# Patient Record
Sex: FEMALE | Race: BLACK OR AFRICAN AMERICAN | NOT HISPANIC OR LATINO | Employment: FULL TIME | URBAN - METROPOLITAN AREA
[De-identification: names, ages, dates, MRNs, and addresses within clinical notes are randomized per-mention and may not be internally consistent; named-entity substitution may affect disease eponyms.]

---

## 2024-01-31 ENCOUNTER — PATIENT MESSAGE (OUTPATIENT)
Dept: PSYCHIATRY | Facility: CLINIC | Age: 44
End: 2024-01-31
Payer: COMMERCIAL

## 2024-01-31 DIAGNOSIS — F90.0 ADHD (ATTENTION DEFICIT HYPERACTIVITY DISORDER), INATTENTIVE TYPE: ICD-10-CM

## 2024-01-31 RX ORDER — DEXTROAMPHETAMINE SACCHARATE, AMPHETAMINE ASPARTATE MONOHYDRATE, DEXTROAMPHETAMINE SULFATE AND AMPHETAMINE SULFATE 5; 5; 5; 5 MG/1; MG/1; MG/1; MG/1
20 CAPSULE, EXTENDED RELEASE ORAL EVERY MORNING
Qty: 30 CAPSULE | Refills: 0 | Status: CANCELLED | OUTPATIENT
Start: 2024-01-31 | End: 2024-03-01

## 2024-02-07 ENCOUNTER — PATIENT MESSAGE (OUTPATIENT)
Dept: NEUROLOGY | Facility: CLINIC | Age: 44
End: 2024-02-07

## 2024-02-07 ENCOUNTER — PROCEDURE VISIT (OUTPATIENT)
Dept: NEUROLOGY | Facility: CLINIC | Age: 44
End: 2024-02-07
Payer: COMMERCIAL

## 2024-02-07 VITALS
DIASTOLIC BLOOD PRESSURE: 78 MMHG | HEART RATE: 104 BPM | SYSTOLIC BLOOD PRESSURE: 127 MMHG | WEIGHT: 220 LBS | BODY MASS INDEX: 32.58 KG/M2 | HEIGHT: 69 IN

## 2024-02-07 DIAGNOSIS — G43.709 CHRONIC MIGRAINE WITHOUT AURA WITHOUT STATUS MIGRAINOSUS, NOT INTRACTABLE: Primary | ICD-10-CM

## 2024-02-07 DIAGNOSIS — M54.81 OCCIPITAL NEURALGIA OF RIGHT SIDE: ICD-10-CM

## 2024-02-07 PROCEDURE — 64615 CHEMODENERV MUSC MIGRAINE: CPT | Mod: S$GLB,,, | Performed by: PSYCHIATRY & NEUROLOGY

## 2024-02-07 NOTE — PROCEDURES
Procedures  BOTOX was performed as an indicated therapy for intractable chronic migraine headaches given that the patient failed more than 2 headache medications    Botulinum Toxin Injection Procedure   Pre-operative diagnosis: Chronic migraine   Post-operative diagnosis: Same   Procedure: Chemical neurolysis   After risks and benefits were explained including bleeding, infection, worsening of pain, damage to the areas being injected, weakness of muscles, loss of muscle control, dysphagia if injecting the head or neck, facial droop if injecting the facial area, painful injection, allergic or other reaction to the medications being injected, and the failure of the procedure to help the problem, a signed consent was obtained. Time out was called with confirmation of name and date of birth as well as the treatment site with a witness in the room.  The patient was placed in a comfortable area and the sites to be treated were identified.  Next, a 30 gauge needle was used to inject the medication in the area to be treated.   Area(s) injected:   Total Botox used: 155 Units   Botox wastage: 45 Units   Injection sites:    muscle bilaterally ( a total of 10 units divided into 2 sites)   Procerus muscle (5 units)   Frontalis muscle bilaterally (a total of 20 units divided into 4 sites)   Temporalis muscle bilaterally (a total of 40 units divided into 8 sites)   Occipitalis muscle bilaterally (a total of 30 units divided into 6 sites)   Cervical paraspinal muscles (a total of 20 units divided into 4 sites)   Trapezius muscle bilaterally (a total of 30 units divided into 6 sites)   Complications: none   RTC for the next Botox injection: 3 months     The patient tolerated the procedure well and did not experience any complications.     Problem List Items Addressed This Visit          Neuro    Chronic migraine without aura without status migrainosus, not intractable - Primary    Overview     Best headache control with  Emgality and Botox. This is Medically Necessary as she has partial response to each agent individually. Has been more than 50% beneficial previously for reduction of headaches, will ask for prior auth    She has tried and failed anti seizure medications, anti-inflammatories, triptans (Maxalt, Imitrex, Axert), antidepressants, antihypertensives and still having high burden of headaches.   Aimovig failed, Emgality continue.     Axert,Sprix as rescue  Nurtec is medically necessary  Emgality + Botox for prevention is medically necessary, with excellent control of headaches at 2 days per month, during ovulation and menstruation    botox on 10/18/23, 2/7/24           Relevant Medications    onabotulinumtoxina injection 200 Units (Completed)    Other Relevant Orders    Prior authorization Order

## 2024-02-27 ENCOUNTER — OFFICE VISIT (OUTPATIENT)
Dept: NEUROLOGY | Facility: CLINIC | Age: 44
End: 2024-02-27
Payer: COMMERCIAL

## 2024-02-27 VITALS
HEART RATE: 83 BPM | HEIGHT: 69 IN | WEIGHT: 220 LBS | BODY MASS INDEX: 32.58 KG/M2 | DIASTOLIC BLOOD PRESSURE: 82 MMHG | SYSTOLIC BLOOD PRESSURE: 130 MMHG

## 2024-02-27 DIAGNOSIS — M54.81 BILATERAL OCCIPITAL NEURALGIA: Primary | ICD-10-CM

## 2024-02-27 DIAGNOSIS — M54.81 OCCIPITAL NEURALGIA OF RIGHT SIDE: ICD-10-CM

## 2024-02-27 PROCEDURE — 76942 ECHO GUIDE FOR BIOPSY: CPT | Mod: S$GLB,,, | Performed by: PSYCHIATRY & NEUROLOGY

## 2024-02-27 PROCEDURE — 64450 NJX AA&/STRD OTHER PN/BRANCH: CPT | Mod: 50,S$GLB,, | Performed by: PSYCHIATRY & NEUROLOGY

## 2024-02-27 PROCEDURE — 99999 PR PBB SHADOW E&M-EST. PATIENT-LVL III: CPT | Mod: PBBFAC,,, | Performed by: PSYCHIATRY & NEUROLOGY

## 2024-02-27 PROCEDURE — 64405 NJX AA&/STRD GR OCPL NRV: CPT | Mod: 50,51,S$GLB, | Performed by: PSYCHIATRY & NEUROLOGY

## 2024-02-27 PROCEDURE — 99499 UNLISTED E&M SERVICE: CPT | Mod: S$GLB,,, | Performed by: PSYCHIATRY & NEUROLOGY

## 2024-02-27 NOTE — PROGRESS NOTES
"  Bilateral Greater and Lesser Occipital Nerve Block    Date/Time: 2/27/2024 8:40 AM    Performed by: Rick Chicas III, MD  Authorized by: Rick Chicas III, MD  Consent Done: Yes  Time out: Immediately prior to procedure a "time out" was called to verify the correct patient, procedure, equipment, support staff and site/side marked as required.  Indications: pain relief  Body area: head  Laterality: bilateral greater and lesser occipital nerve.    Medications administered: DepoMedrol 40 mg injection  Patient position: prone  Needle size: 27 G  Location technique: ultrasound guidance and anatomical landmarks  Local Anesthetic: lidocaine 1% without epinephrine and bupivacaine 0.25% without epinephrine  Anesthetic total: 6 mL  Outcome: pain improved  Patient tolerance: Patient tolerated the procedure well with no immediate complications  Comments: Patient will send her pain diary via the patient portal.  Depending on results, consider further treatment options.      "

## 2024-03-19 ENCOUNTER — PATIENT MESSAGE (OUTPATIENT)
Dept: NEUROLOGY | Facility: CLINIC | Age: 44
End: 2024-03-19
Payer: COMMERCIAL

## 2024-03-28 DIAGNOSIS — G43.709 CHRONIC MIGRAINE WITHOUT AURA WITHOUT STATUS MIGRAINOSUS, NOT INTRACTABLE: ICD-10-CM

## 2024-03-28 RX ORDER — RIMEGEPANT SULFATE 75 MG/75MG
75 TABLET, ORALLY DISINTEGRATING ORAL DAILY PRN
Qty: 8 TABLET | Refills: 12 | Status: SHIPPED | OUTPATIENT
Start: 2024-03-28

## 2024-04-22 ENCOUNTER — OFFICE VISIT (OUTPATIENT)
Dept: NEUROLOGY | Facility: CLINIC | Age: 44
End: 2024-04-22
Payer: COMMERCIAL

## 2024-04-22 DIAGNOSIS — M54.81 BILATERAL OCCIPITAL NEURALGIA: Primary | ICD-10-CM

## 2024-04-22 DIAGNOSIS — G43.709 CHRONIC MIGRAINE WITHOUT AURA WITHOUT STATUS MIGRAINOSUS, NOT INTRACTABLE: ICD-10-CM

## 2024-04-22 PROCEDURE — 99214 OFFICE O/P EST MOD 30 MIN: CPT | Mod: 95,,, | Performed by: PSYCHIATRY & NEUROLOGY

## 2024-04-22 NOTE — PROGRESS NOTES
The patient location is: Home  The chief complaint leading to consultation is: Headache  Visit type: Virtual visit with synchronous audio and video  Total time spent with patient: 15  Each patient to whom he or she provides medical services by telemedicine is:  (1) informed of the relationship between the physician and patient and the respective role of any other health care provider with respect to management of the patient; and (2) notified that he or she may decline to receive medical services by telemedicine and may withdraw from such care at any time.    Subjective:       Patient ID: Alicia Petty is a 43 y.o. female.    Interval History:  Alicia Petty is here for follow up. Their condition has clinically improved.  Upon review of her pain diary it appears that the patient was actually pain free for almost a week after the initial nerve block on 02/27.  Her headaches started coming back after that.  We have discussed that she would be a candidate for cryoneurolysis of bilateral greater occipital nerves because we are able to reduce her pain from an 8/10 to a 0/10 and even when the pain came back it was a 4/10 in intensity. Pain was initially 8/10, then she was pain free for almost a week, but was only 4/10.     Objective:   Patient is awake, alert and oriented to person, place and time. Moves all extremities antigravity. Cranial Nerves 2-12 are without gross focal deficit.     Focused telemedicine examination was undertaken today. Over 50% of face to face time of 15 minute visit time was in giving guidance, counseling and discussing treatment options.    Assessment/Plan:     Problem List Items Addressed This Visit          Neuro    Chronic migraine without aura without status migrainosus, not intractable    Overview     Best headache control with Emgality and Botox. This is Medically Necessary as she has partial response to each agent individually. Has been more than 50% beneficial previously  for reduction of headaches, will ask for prior auth    She has tried and failed anti seizure medications, anti-inflammatories, triptans (Maxalt, Imitrex, Axert), antidepressants, antihypertensives and still having high burden of headaches.   Aimovig failed, Emgality continue.     Axert,Sprix as rescue  Nurtec is medically necessary  Emgality + Botox for prevention is medically necessary, with excellent control of headaches at 2 days per month, during ovulation and menstruation    botox on 10/18/23, 2/7/24              Orthopedic    Bilateral occipital neuralgia - Primary    Overview     S/p RFA with pain management 5/2019  Over 2 years of benefit    Repeat nerve block bgon and michelle 2/27/24         Relevant Orders    Ioverroma     43-year-old female presents for evaluation and follow-up of complex multifactorial headaches.  At this time we have discussed continuing her Emgality and Botox.  Her next round of Botox should be somewhere in early May.  I will ask for prior authorization of cryoneurolysis of bilateral greater and lesser occipital nerves.  The patient has a distinct and different headache type, occipital neuralgia to which we have been able to reduce her headache burden by more than 50% with ultrasound-guided occipital nerve block at lesser and greater occipital nerve.  She would therefore be a good candidate for cryoneurolysis of these nerves to get her 6 months to 12 months of benefit at a time.      The patient verbalizes understanding and agreement with the treatment plan. I have discussed risks, benefits and alternatives to the treatment plan. Questions were sought and answered to her stated verbal satisfaction.      Rick Chicas MD, FAAN    This note is dictated on M*Modal Fluency Direct word recognition program. There are word recognition mistakes that are occasionally missed on review.    This is a medically necessary procedure as the patient has Occipital Neuralgia which I have previously documented. I  am treating them with the cryoneurolysis specifically for Occipital Neuralgia. This is a safe and effective treatment for headaches that has been around since the 1970s and has been modernized to use a smaller device that has FDA approval.    References:     Genesis GILLESPIE, Ivania SCOTT, Valencia MENDES, Juan Francisco DOYLE, Delilah GARCIA (2019) Cryotherapy. In: Opal GARBER., Pope TOÑO., Zehra GARBER., Dinah CARDENAS. (eds) [place]'s Treatment of Pain. Huggins, Kwasi    Jaime MARIN, Vasile MARIN, Dandre CARDENAS. New Technique for Cryoneuroablation of the Proximal Greater Occipital Nerve. Pain Pract. 2019 Jul;19(6):594-601. doi:10.1111/papr.60392. Epub 2019 Mar 22. PubMed PMID: 51196885.    José Manuel ZHANG, Angelique MARRERO. Occipital Neuralgia. 2019 Jun 4. [employer] [Internet]. [place]): [employer]; 2019 Jan-. Available from http://www.ncbi.nlm.nih.gov/books/OQE341864/ PubMed PMID: 47002637.    Paty A, Kristian A, Mainsarahy C, Wendy B, Garvin E, Paty B, Harinder MARIN. Greater occipital nerve cryoneurolysis in the management of intractable occipital neuralgia. J Neuroradiol. 2018 Oct;45(6):386-390.    Mckenzie I, Naye SR. Neuralgias of the Head: Occipital Neuralgia. J. [place] Med. Sci. 2016 Apr;31(4):479-88.    Delilah CH, José W, Mayank J, Gerald K, John T, Guerrero FUENTES. Cryoablation for the treatment of occipital neuralgia. Pain Physician. 2015 May-Jun;18(3):E363-8. PubMed PMID: 89877009.    Coby R, Elkin W. Occipital Neuralgia and Cervicogenic Headache: Diagnosis and Management. Curr Neurol Neurosci Rep. 2019 Mar 19;19(5):20. doi:10.1007/y32987-368-3214-6. Review. PubMed PMID: 39938449.    Cyrus S, Jerel J, Francisca BANERJEE, Shaheed M, Deepali RS. A Case Report of an Enlarged Suboccipital Nerve with Cutaneous Branch. Cureus. 2018 Jul 06;10(7):e2933.    Nhan E, Miranda J, Bubba-Shaji D, Oseas A, Gail ÁLG. Clinical Characteristics and Therapeutic Results in a Series of 68 Patients with Occipital Neuralgia. Pain Med. 2019 Sep 9. pii: [id number]. doi:  10.1093/pm/[id number]. [Epub ahead of print] PubMed PMID: 18810255.    Jose LM, Abd-Tirso A, Meagan TJ, Annette H. Treatment of Occipital Neuralgia by Thermal Radiofrequency Ablation. [facility] J. 2018 Fall;18(3):209-214. doi: 10.27319/leidy.17.0104. PubMed PMID: 20874432; PubMed Central PMCID:  [id number].    Carolyn WM, Brennan EC. For cluster headaches: cryosurgery. Va Med. 1981Sep;108(9):622-4.     Bowen LUNDBERG. Chilling away the pain of vascular headache. MER. 1979 Aug 10;242(6):504.     Freddy ALLRED. Cryosurgery of headache. Res Clin Stud Headache. 1978;5:.    Freddy ALLRED. Cryosurgery of migraine. Headache. 1973 Jan;12(4):143-50.     Cliff E, Janice P, Liset M, Nieves O, Pedro G, Indigo S. The role of intercostal cryoanalgesia in post-thoracotomy analgesia. Interact Cardiovasc Thorac Surg. 2013 Jun;16(6):814-8. doi: 10.1093/icvts/eln726. Epub 2013 Feb 19.    Maxwell NA, Lainey JH, Gabo AR. Ultrasound-guided cryoablation of genitofemoral nerve for chronic inguinal pain. Pain Physician. 2009 Nov-Dec;12(6):997-1000.     Jass J, Jamal S, Myron T, Tamie N, Michela V, Marly A, Donaldoschemi R, Andres FW. Pain is significantly reduced by cryoablation therapy in patients with lateral minithoracotomy. Tracy Thorac Surg. 2000 Sep;70(3):1100-4.     Vivian JF, Buffy TA. Response of cervicogenic headaches and occipital neuralgia to radiofrequency ablation of the C2 dorsal root ganglion and/or third occipital nerve. Headache. 2014 Mar;54(3):500-10. doi: 10.1111/head.05964.     BRANDON Resendez., Cryoanalgesia. The application of low temperatures to nerves to produce anaesthesia or analgesia. Anaesthesia, 1981. 36(11): p. 1003-13.    WANDER Pablo., Significance of clinical treatments on peripheral nerve and its effect on nerve regeneration. J Neurol Disord, 2014. 2(4): p. 68.    MARINO Burnette, Cryoanalgesia in interventional pain management. Pain Physician, 2003. 6(3): p. 345-60.    TNAIA Rhoades, Cryoanalgesia for  post-herpetic neuralgia: a new treatment. Int J Dermatol, 2011. 50(6): p. 746-50.    TANIA Sandoval, WARNER Bobby, and ALVERTO Ulloa, Cryogenic denervation of the intermetatarsal space neuroma. The Journal of Foot and Ankle Surgery, 1997. 36(4): p. 311-314.    ISABEL Maloney, ALIE Estrada, and ARLETTE Bruce, Sonographically guided cryoneurolysis: preliminary experience and clinical outcomes. J Ultrasound Med, 2012. 31(12): p. 2025-34.    ALIE Guthrie, YANIRA Shaffer, and YANIRA Resendez, Cryoanalgesia in the management of chronic facial pain. J Maxillofac Surg, 1981. 9(2): p. 101-2.    DARLYN Soria, A 22-year study of paroxysmal trigeminal neuralgia in 211 patients with a 3-year appraisal of the role of cryotherapy. Oral Surg Oral Med Oral Pathol, 1984. 58(1): p. 17-23.    CHANDAN Yanes, Cryotherapy in the management of paroxysmal trigeminal neuralgia. J Neurol Neurosurg Psychiatry, 1987. 50(4): p. 485-7.    CHANDAN Yanes, Cryotherapy for trigeminal neuralgia: a 10 year audit. Br J Oral Maxillofac Surg, 1991. 29(1): p. 1-4.    CHANDAN Yanes and DARLYN Soria, The role of cryotherapy (cryoanalgesia) in the management of paroxysmal trigeminal neuralgia: a six year experience. Br J Oral Maxillofac Surg, 1988. 26(1): p. 18-25.    RAULITO Scruggs., MALKA Garcia, and HAROLDO Smith, Cryoanalgesia in the management of intractable pain in the temporomandibular joint: a five-year retrospective review. Br J Oral Maxillofac Surg, 2011. 49(8): p. 653-6.    JOSE DANIEL Yoon, et al., CT guided percutaneous cryoneurolysis for post thoracotomy pain syndrome: early experience and effectiveness. Acad Radiol, 2010. 17(5): p. 603-6.    MARI Cortez and RYAN Jones, Ultrasound-guided intercostal nerve cryoablation. Anesth Analg, 2006. 103(4): p. 1033-5.    OLMAN Resendez, LAZARO Shaffer, and TIERRA Fofana, Cryoanalgesia for intractable perineal pain. J R Soc Med, 1981. 74(11): p. 804-9.    MICHAEL Salcedo. and AMMON Jonhson, The history of cryosurgery. J R Soc Med, 2001. 94(4): p.  196-201.    FAHAD Simon, History of cryosurgery. Semin Surg Oncol, 1998. 14(2): p. .    PENNIE Jin., A classification of peripheral nerve injuries producing loss of function. Brain, 1951. 74(4): p. 491-516.    LUKE Jin, Nerves and nerve injuries. 1968, [place] [employer]: [place].    TANIA Mcpherson, et al., Mechanism research of cryoanalgesia. Neurol Res, 1995. 17(4): p. 307.    MARI Duncan and AMBER Samaniego, Pathophysiology of peripheral nerve injury: a brief review. Neurosurgical focus, 2004. 16(5): p. 1-7.    Hazel N., et al., Effects of cryoanalgesia on post-thoracotomy pain and on the structure of intercostal nerves: a human prospective randomized trial and a histological study. Eur J Cardiothorac Surg, 2001. 20(3): p. 502-7.  BRANDON Resendez., LAZARO Shaffer, and MO Mukherjee, Cryoanalgesia: the response to alterations in freeze cycle and temperature. Br J Anaesth, 1981. 53(11): p. 1121-7.    JOSE DANIEL Alonso and LAURYN Lorenzo, Neurofilament elongation into regenerating facial nerve axons. Neuroscience, 1989. 29(3): p. 659-66.    TANIA Esteves, Neuroscience: Fundamentals for Rehabilitation; 3rd ed. 2007, [employer]: [place], [place].    MO Jones, Manuel ALLRED, Current concepts of neurolysis and clinical applications. Journal of the Analgesics, 2014. 2: p. 16-22.    VERNA Pablo and DARLYN Jacinto, Wallerian degeneration and recovery of motor nerves after multiple focused cold therapies. Muscle & Nerve, 2015. 51(2): p. 268-275.    FRANTZ Dudley., Maria R HERRERA, et al., Cryotherapy in treatment of low back pain. Chin J Surg (Chinese), 1991. 29(12): p. 721-726

## 2024-05-06 ENCOUNTER — PROCEDURE VISIT (OUTPATIENT)
Dept: NEUROLOGY | Facility: CLINIC | Age: 44
End: 2024-05-06
Payer: COMMERCIAL

## 2024-05-06 VITALS
DIASTOLIC BLOOD PRESSURE: 88 MMHG | WEIGHT: 230 LBS | HEIGHT: 69 IN | BODY MASS INDEX: 34.07 KG/M2 | SYSTOLIC BLOOD PRESSURE: 136 MMHG | HEART RATE: 97 BPM

## 2024-05-06 DIAGNOSIS — G43.709 CHRONIC MIGRAINE WITHOUT AURA WITHOUT STATUS MIGRAINOSUS, NOT INTRACTABLE: Primary | ICD-10-CM

## 2024-05-06 PROCEDURE — 64615 CHEMODENERV MUSC MIGRAINE: CPT | Mod: S$GLB,,, | Performed by: PSYCHIATRY & NEUROLOGY

## 2024-05-06 NOTE — PROCEDURES
Procedures  BOTOX was performed as an indicated therapy for intractable chronic migraine headaches given that the patient failed more than 2 headache medications    Botulinum Toxin Injection Procedure   Pre-operative diagnosis: Chronic migraine   Post-operative diagnosis: Same   Procedure: Chemical neurolysis   After risks and benefits were explained including bleeding, infection, worsening of pain, damage to the areas being injected, weakness of muscles, loss of muscle control, dysphagia if injecting the head or neck, facial droop if injecting the facial area, painful injection, allergic or other reaction to the medications being injected, and the failure of the procedure to help the problem, a signed consent was obtained. Time out was called with confirmation of name and date of birth as well as the treatment site with a witness in the room.  The patient was placed in a comfortable area and the sites to be treated were identified.  Next, a 30 gauge needle was used to inject the medication in the area to be treated.   Area(s) injected:   Total Botox used: 155 Units   Botox wastage: 45 Units   Injection sites:    muscle bilaterally ( a total of 10 units divided into 2 sites)   Procerus muscle (5 units)   Frontalis muscle bilaterally (a total of 20 units divided into 4 sites)   Temporalis muscle bilaterally (a total of 40 units divided into 8 sites)   Occipitalis muscle bilaterally (a total of 30 units divided into 6 sites)   Cervical paraspinal muscles (a total of 20 units divided into 4 sites)   Trapezius muscle bilaterally (a total of 30 units divided into 6 sites)   Complications: none   RTC for the next Botox injection: 3 months     The patient tolerated the procedure well and did not experience any complications.     Problem List Items Addressed This Visit          Neuro    Chronic migraine without aura without status migrainosus, not intractable - Primary    Overview     Best headache control with  Emgality and Botox. This is Medically Necessary as she has partial response to each agent individually. Has been more than 50% beneficial previously for reduction of headaches, will ask for prior auth    She has tried and failed anti seizure medications, anti-inflammatories, triptans (Maxalt, Imitrex, Axert), antidepressants, antihypertensives and still having high burden of headaches.   Aimovig failed, Emgality continue.     Axert,Sprix as rescue  Nurtec is medically necessary  Emgality + Botox for prevention is medically necessary, with excellent control of headaches at 2 days per month, during ovulation and menstruation    botox on 10/18/23, 2/7/24, 5/6/24           Relevant Medications    onabotulinumtoxina injection 200 Units (Completed)    Other Relevant Orders    Prior authorization Order

## 2024-05-11 ENCOUNTER — TELEPHONE (OUTPATIENT)
Dept: NEUROLOGY | Facility: CLINIC | Age: 44
End: 2024-05-11
Payer: COMMERCIAL

## 2024-05-11 NOTE — TELEPHONE ENCOUNTER
Attempted to call patient, LVM to return call.    Reason: schedule next botox with BURAK [place].  Next botox due: 7/30/24

## 2024-05-20 ENCOUNTER — PATIENT MESSAGE (OUTPATIENT)
Dept: FAMILY MEDICINE | Facility: CLINIC | Age: 44
End: 2024-05-20
Payer: COMMERCIAL

## 2024-05-21 ENCOUNTER — OFFICE VISIT (OUTPATIENT)
Dept: FAMILY MEDICINE | Facility: CLINIC | Age: 44
End: 2024-05-21
Payer: COMMERCIAL

## 2024-05-21 VITALS
WEIGHT: 234.13 LBS | SYSTOLIC BLOOD PRESSURE: 124 MMHG | DIASTOLIC BLOOD PRESSURE: 82 MMHG | BODY MASS INDEX: 34.68 KG/M2 | TEMPERATURE: 98 F | HEART RATE: 94 BPM | OXYGEN SATURATION: 100 % | HEIGHT: 69 IN

## 2024-05-21 DIAGNOSIS — F32.A ANXIETY AND DEPRESSION: ICD-10-CM

## 2024-05-21 DIAGNOSIS — E55.9 VITAMIN D DEFICIENCY: ICD-10-CM

## 2024-05-21 DIAGNOSIS — R00.2 INTERMITTENT PALPITATIONS: Primary | ICD-10-CM

## 2024-05-21 DIAGNOSIS — I49.3 FREQUENT UNIFOCAL PVCS: ICD-10-CM

## 2024-05-21 DIAGNOSIS — F41.9 ANXIETY AND DEPRESSION: ICD-10-CM

## 2024-05-21 LAB
OHS QRS DURATION: 88 MS
OHS QTC CALCULATION: 466 MS

## 2024-05-21 PROCEDURE — 93010 ELECTROCARDIOGRAM REPORT: CPT | Mod: S$GLB,,, | Performed by: INTERNAL MEDICINE

## 2024-05-21 PROCEDURE — 99214 OFFICE O/P EST MOD 30 MIN: CPT | Mod: S$GLB,,, | Performed by: PEDIATRICS

## 2024-05-21 PROCEDURE — 93005 ELECTROCARDIOGRAM TRACING: CPT | Mod: S$GLB,,, | Performed by: PEDIATRICS

## 2024-05-21 PROCEDURE — 99999 PR PBB SHADOW E&M-EST. PATIENT-LVL IV: CPT | Mod: PBBFAC,,, | Performed by: PEDIATRICS

## 2024-05-21 RX ORDER — PROPRANOLOL HYDROCHLORIDE 60 MG/1
60 CAPSULE, EXTENDED RELEASE ORAL DAILY
Qty: 30 CAPSULE | Refills: 0 | Status: SHIPPED | OUTPATIENT
Start: 2024-05-21 | End: 2025-05-21

## 2024-05-21 NOTE — PROGRESS NOTES
Subjective:      Patient ID: Alicia Petty is a 43 y.o. female.    Chief Complaint: Palpitations (Started Friday strong, frequent, disrupts voice when happening, chest pains )    Patient here for palpitations, reports on and off since Friday. Friday, palpitations were constant but without any other symptoms. Reports being under a tremendous amount of stress, admits it is not really new but more ongoing. Deadlines are getting closer so feels more of it now. Denies chest pain or other symptoms w/ palpations. Was treated for this before by Celia, used propranolol for about a month and palpitations went away. Admits she takes adderall Monday-Thursday with no changes in dosing or administration.    Palpitations   Pertinent negatives include no chest pain, coughing, dizziness, fever, nausea, shortness of breath or vomiting.     Review of Systems   Constitutional:  Negative for chills, fatigue and fever.   HENT:  Negative for congestion, ear pain, postnasal drip, rhinorrhea, sinus pressure, sinus pain, sneezing and sore throat.    Respiratory:  Negative for cough, chest tightness, shortness of breath and wheezing.    Cardiovascular:  Positive for palpitations. Negative for chest pain.   Gastrointestinal:  Negative for diarrhea, nausea and vomiting.   Genitourinary:  Negative for difficulty urinating.   Musculoskeletal:  Negative for arthralgias.   Skin:  Negative for rash.   Neurological:  Negative for dizziness and headaches.   Psychiatric/Behavioral:  Negative for confusion.         Current Outpatient Medications on File Prior to Visit   Medication Sig    acetaminophen (TYLENOL) 500 MG tablet acetaminophen 500 mg tablet   Take 2 tablets every 8 hours by oral route for 30 days.    almotriptan (AXERT) 12.5 MG tablet Take 1 tablet by mouth as needed for migraine. May repeat in 2 hours, if needed. Max 2 tablets/day.    celecoxib (CELEBREX) 100 MG capsule Take 100 mg by mouth once daily.    galcanezumab-gnlm (EMGALITY  PEN) 120 mg/mL PnIj Inject 120 mg into the skin every 28 days.    ibuprofen (ADVIL,MOTRIN) 600 MG tablet Take 1 tablet (600 mg total) by mouth every 6 (six) hours as needed for Pain.    onabotulinumtoxinA (BOTOX INJ) Inject as directed. For migraines    promethazine (PHENERGAN) 25 MG tablet Take 1 tablet (25 mg total) by mouth every 6 (six) hours as needed for Nausea.    rimegepant (NURTEC) 75 mg odt Take 1 tablet (75 mg total) by mouth daily as needed for Migraine (once per 48 hours). Place ODT tablet on the tongue; alternatively the ODT tablet may be placed under the tongue    tranexamic acid (LYSTEDA) 650 mg tablet Take 2 tablets (1,300 mg total) by mouth 3 (three) times daily.    buPROPion (WELLBUTRIN XL) 300 MG 24 hr tablet Take 1 tablet (300 mg total) by mouth once daily.    dextroamphetamine-amphetamine (ADDERALL XR) 20 MG 24 hr capsule Take 1 capsule (20 mg total) by mouth every morning.     No current facility-administered medications on file prior to visit.        Objective:     Vitals:    05/21/24 1134   BP: 124/82   Pulse: 94   Temp: 97.7 °F (36.5 °C)      Physical Exam  Constitutional:       General: She is not in acute distress.     Appearance: Normal appearance.   HENT:      Head: Normocephalic and atraumatic.      Right Ear: External ear normal.      Left Ear: External ear normal.      Nose: Nose normal.      Mouth/Throat:      Mouth: Mucous membranes are moist.      Pharynx: Oropharynx is clear.   Eyes:      Extraocular Movements: Extraocular movements intact.      Conjunctiva/sclera: Conjunctivae normal.      Pupils: Pupils are equal, round, and reactive to light.   Cardiovascular:      Rate and Rhythm: Normal rate and regular rhythm.      Pulses: Normal pulses.      Heart sounds: Normal heart sounds.   Pulmonary:      Effort: Pulmonary effort is normal. No respiratory distress.      Breath sounds: Normal breath sounds. No wheezing.   Abdominal:      General: Abdomen is flat.   Musculoskeletal:          General: No swelling. Normal range of motion.      Cervical back: Normal range of motion and neck supple.   Skin:     General: Skin is warm and dry.      Findings: No rash.   Neurological:      Mental Status: She is alert and oriented to person, place, and time.      Gait: Gait normal.   Psychiatric:         Mood and Affect: Mood normal.         Behavior: Behavior normal.        Assessment:         1. Intermittent palpitations    2. Anxiety and depression    3. Vitamin D deficiency    4. Frequent unifocal PVCs          Plan:   1. Intermittent palpitations  - propranoloL (INDERAL LA) 60 MG 24 hr capsule; Take 1 capsule (60 mg total) by mouth once daily.  Dispense: 30 capsule; Refill: 0  - Comprehensive Metabolic Panel; Future  - Hemoglobin A1C; Future  - Lipid Panel; Future  - T4, Free; Future  - TSH; Future  - Vitamin B12; Future  - CBC auto differential; Future  - IN OFFICE EKG 12-LEAD (to [facility])    2. Anxiety and depression  - propranoloL (INDERAL LA) 60 MG 24 hr capsule; Take 1 capsule (60 mg total) by mouth once daily.  Dispense: 30 capsule; Refill: 0  - Comprehensive Metabolic Panel; Future  - Hemoglobin A1C; Future  - Lipid Panel; Future  - T4, Free; Future  - TSH; Future  - Vitamin B12; Future  - CBC auto differential; Future    3. Vitamin D deficiency  - Vitamin B12; Future    4. Frequent unifocal PVCs  - propranoloL (INDERAL LA) 60 MG 24 hr capsule; Take 1 capsule (60 mg total) by mouth once daily.  Dispense: 30 capsule; Refill: 0  - Comprehensive Metabolic Panel; Future  - Hemoglobin A1C; Future  - Lipid Panel; Future  - T4, Free; Future  - TSH; Future  - Vitamin B12; Future  - CBC auto differential; Future  - IN OFFICE EKG 12-LEAD (to [place])         Schedule annual exam w/ Celia w/ labs before.  Cardiac symptoms, go to ER.  Contact clinic if needed.         Bennie Hallman NP   [facility]  5/21/24

## 2024-05-30 ENCOUNTER — PROCEDURE VISIT (OUTPATIENT)
Dept: NEUROLOGY | Facility: CLINIC | Age: 44
End: 2024-05-30
Payer: COMMERCIAL

## 2024-05-30 VITALS
DIASTOLIC BLOOD PRESSURE: 86 MMHG | BODY MASS INDEX: 34.07 KG/M2 | WEIGHT: 230 LBS | SYSTOLIC BLOOD PRESSURE: 135 MMHG | HEIGHT: 69 IN | HEART RATE: 101 BPM

## 2024-05-30 DIAGNOSIS — M54.81 BILATERAL OCCIPITAL NEURALGIA: ICD-10-CM

## 2024-05-30 PROCEDURE — 76942 ECHO GUIDE FOR BIOPSY: CPT | Mod: S$GLB,,, | Performed by: PSYCHIATRY & NEUROLOGY

## 2024-05-30 PROCEDURE — 64640 INJECTION TREATMENT OF NERVE: CPT | Mod: 50,S$GLB,, | Performed by: PSYCHIATRY & NEUROLOGY

## 2024-05-30 PROCEDURE — 64405 NJX AA&/STRD GR OCPL NRV: CPT | Mod: 50,59,S$GLB, | Performed by: PSYCHIATRY & NEUROLOGY

## 2024-05-30 PROCEDURE — 64450 NJX AA&/STRD OTHER PN/BRANCH: CPT | Mod: 50,59,S$GLB, | Performed by: PSYCHIATRY & NEUROLOGY

## 2024-05-30 NOTE — PROCEDURES
[place]    Date/Time: 5/30/2024 8:40 AM    Performed by: Rick Chicas III, MD  Authorized by: Rick Chicas III, MD  Local anesthesia used: yes  Anesthesia: nerve block    Anesthesia:  Local anesthesia used: yes  Local Anesthetic: lidocaine 2% without epinephrine and bupivacaine 0.5% without epinephrine  Anesthetic total: 4 mL    Sedation:  Patient sedated: no    Patient tolerance: patient tolerated the procedure well with no immediate complications      Informed consent was obtained and will be scanned into the electronic medical record.     The patient was placed into a prone position with the neck flexed forward.  Nerve block was accomplished first with ultrasound guidance and anatomic landmarks using lidocaine 1% without epinephrine injected through a 27-gauge needle with topical spray and anesthetic and cleaning solution applied before injection at bilateral greater and lesser occipital nerves.  Afterwards cryoneurolysis was performed using ultrasound guidance and anatomic landmarks after nerve block at bilateral greater and lesser occipital nerves.  Ultrasound images were saved on secure external hard drive.    There were 4 nerves at 4 sites treated using a total of 12 treatment cycles.

## 2024-06-07 DIAGNOSIS — Z00.00 ROUTINE GENERAL MEDICAL EXAMINATION AT A HEALTH CARE FACILITY: Primary | ICD-10-CM

## 2024-06-14 ENCOUNTER — HOSPITAL ENCOUNTER (OUTPATIENT)
Dept: RADIOLOGY | Facility: HOSPITAL | Age: 44
Discharge: HOME | End: 2024-06-14
Attending: NURSE PRACTITIONER
Payer: COMMERCIAL

## 2024-06-14 DIAGNOSIS — Z12.31 ENCOUNTER FOR SCREENING MAMMOGRAM FOR BREAST CANCER: ICD-10-CM

## 2024-06-14 PROCEDURE — 77067 SCR MAMMO BI INCL CAD: CPT | Mod: TC,PO

## 2024-06-14 PROCEDURE — 77063 BREAST TOMOSYNTHESIS BI: CPT | Mod: 26,,, | Performed by: RADIOLOGY

## 2024-06-14 PROCEDURE — 77067 SCR MAMMO BI INCL CAD: CPT | Mod: 26,,, | Performed by: RADIOLOGY

## 2024-06-21 ENCOUNTER — HOSPITAL ENCOUNTER (OUTPATIENT)
Dept: CARDIOLOGY | Facility: HOSPITAL | Age: 44
Discharge: HOME | End: 2024-06-21
Attending: FAMILY MEDICINE
Payer: COMMERCIAL

## 2024-06-21 ENCOUNTER — OFFICE VISIT (OUTPATIENT)
Dept: INTERNAL MEDICINE | Facility: CLINIC | Age: 44
End: 2024-06-21
Payer: COMMERCIAL

## 2024-06-21 ENCOUNTER — CLINICAL SUPPORT (OUTPATIENT)
Dept: INTERNAL MEDICINE | Facility: CLINIC | Age: 44
End: 2024-06-21
Payer: COMMERCIAL

## 2024-06-21 VITALS
SYSTOLIC BLOOD PRESSURE: 120 MMHG | WEIGHT: 235.88 LBS | BODY MASS INDEX: 34.94 KG/M2 | HEART RATE: 87 BPM | TEMPERATURE: 98 F | DIASTOLIC BLOOD PRESSURE: 73 MMHG | RESPIRATION RATE: 18 BRPM | OXYGEN SATURATION: 100 % | HEIGHT: 69 IN

## 2024-06-21 DIAGNOSIS — Z00.00 PREVENTATIVE HEALTH CARE: Primary | ICD-10-CM

## 2024-06-21 DIAGNOSIS — F90.0 ADHD, PREDOMINANTLY INATTENTIVE TYPE: ICD-10-CM

## 2024-06-21 DIAGNOSIS — R00.2 PALPITATIONS: ICD-10-CM

## 2024-06-21 DIAGNOSIS — Z00.00 ROUTINE GENERAL MEDICAL EXAMINATION AT A HEALTH CARE FACILITY: Primary | ICD-10-CM

## 2024-06-21 DIAGNOSIS — Z82.49 FAMILY HISTORY OF CARDIAC DISORDER: ICD-10-CM

## 2024-06-21 DIAGNOSIS — G93.2 IIH (IDIOPATHIC INTRACRANIAL HYPERTENSION): ICD-10-CM

## 2024-06-21 DIAGNOSIS — Z00.00 ROUTINE GENERAL MEDICAL EXAMINATION AT A HEALTH CARE FACILITY: ICD-10-CM

## 2024-06-21 LAB
ALBUMIN SERPL BCP-MCNC: 3.4 G/DL (ref 3.5–5.2)
ALP SERPL-CCNC: 103 U/L (ref 55–135)
ALT SERPL W/O P-5'-P-CCNC: 11 U/L (ref 10–44)
ANION GAP SERPL CALC-SCNC: 10 MMOL/L (ref 8–16)
AST SERPL-CCNC: 12 U/L (ref 10–40)
BILIRUB SERPL-MCNC: 0.4 MG/DL (ref 0.1–1)
BUN SERPL-MCNC: 12 MG/DL (ref 6–20)
CALCIUM SERPL-MCNC: 9.2 MG/DL (ref 8.7–10.5)
CHLORIDE SERPL-SCNC: 109 MMOL/L (ref 95–110)
CHOLEST SERPL-MCNC: 206 MG/DL (ref 120–199)
CHOLEST/HDLC SERPL: 3.6 {RATIO} (ref 2–5)
CO2 SERPL-SCNC: 22 MMOL/L (ref 23–29)
CREAT SERPL-MCNC: 0.9 MG/DL (ref 0.5–1.4)
ERYTHROCYTE [DISTWIDTH] IN BLOOD BY AUTOMATED COUNT: 14.9 % (ref 11.5–14.5)
EST. GFR  (NO RACE VARIABLE): >60 ML/MIN/1.73 M^2
ESTIMATED AVG GLUCOSE: 103 MG/DL (ref 68–131)
GLUCOSE SERPL-MCNC: 89 MG/DL (ref 70–110)
HBA1C MFR BLD: 5.2 % (ref 4–5.6)
HCT VFR BLD AUTO: 38 % (ref 37–48.5)
HDLC SERPL-MCNC: 58 MG/DL (ref 40–75)
HDLC SERPL: 28.2 % (ref 20–50)
HGB BLD-MCNC: 12.1 G/DL (ref 12–16)
LDLC SERPL CALC-MCNC: 127 MG/DL (ref 63–159)
MCH RBC QN AUTO: 26 PG (ref 27–31)
MCHC RBC AUTO-ENTMCNC: 31.8 G/DL (ref 32–36)
MCV RBC AUTO: 82 FL (ref 82–98)
NONHDLC SERPL-MCNC: 148 MG/DL
PLATELET # BLD AUTO: 361 K/UL (ref 150–450)
PMV BLD AUTO: 10.9 FL (ref 9.2–12.9)
POTASSIUM SERPL-SCNC: 4.4 MMOL/L (ref 3.5–5.1)
PROT SERPL-MCNC: 6.9 G/DL (ref 6–8.4)
RBC # BLD AUTO: 4.65 M/UL (ref 4–5.4)
SODIUM SERPL-SCNC: 141 MMOL/L (ref 136–145)
TRIGL SERPL-MCNC: 105 MG/DL (ref 30–150)
TSH SERPL DL<=0.005 MIU/L-ACNC: 2.41 UIU/ML (ref 0.4–4)
WBC # BLD AUTO: 6.94 K/UL (ref 3.9–12.7)

## 2024-06-21 PROCEDURE — 80053 COMPREHEN METABOLIC PANEL: CPT | Performed by: FAMILY MEDICINE

## 2024-06-21 PROCEDURE — 93005 ELECTROCARDIOGRAM TRACING: CPT

## 2024-06-21 PROCEDURE — 84443 ASSAY THYROID STIM HORMONE: CPT | Performed by: FAMILY MEDICINE

## 2024-06-21 PROCEDURE — 99386 PREV VISIT NEW AGE 40-64: CPT | Mod: S$GLB,,, | Performed by: FAMILY MEDICINE

## 2024-06-21 PROCEDURE — 99999 PR PBB SHADOW E&M-EST. PATIENT-LVL IV: CPT | Mod: PBBFAC,,, | Performed by: FAMILY MEDICINE

## 2024-06-21 PROCEDURE — 83036 HEMOGLOBIN GLYCOSYLATED A1C: CPT | Performed by: FAMILY MEDICINE

## 2024-06-21 PROCEDURE — 80061 LIPID PANEL: CPT | Performed by: FAMILY MEDICINE

## 2024-06-21 PROCEDURE — 93010 ELECTROCARDIOGRAM REPORT: CPT | Mod: ,,, | Performed by: INTERNAL MEDICINE

## 2024-06-21 PROCEDURE — 85027 COMPLETE CBC AUTOMATED: CPT | Performed by: FAMILY MEDICINE

## 2024-06-21 NOTE — Clinical Note
"Alicia wants to ensure that everything is covered by [facility]. (Normally I see "[employer]" as insurance coverage, but not on hers for some reason.)"

## 2024-06-21 NOTE — PROGRESS NOTES
EXECUTIVE HEALTH ENCOUNTER  6/21/24      REASON FOR VISIT  EXECUTIVE HEALTH    HISTORY  History of Present Illness    Alicia presents today for executive health visit and to establish care with a new PCP.    She reports having a Holter monitor in 2021 that showed frequent premature beats but never followed up with a cardiologist. Her brother recently had a stroke and was found to have a patent foramen ovale (PFO). Her sister just had surgery to close a PFO, and her grandfather also had a PFO. She has experienced periods of feeling like she was going to pass out. She gets palpitations that were constantly occurring for 5 days recently but now occur about once every 1-2 days. She thinks the palpitations may be stress-related and denies any other cardiac symptoms.    She has had migraines her entire life that worsened after having meningitis in 2010. She recently underwent a new procedure by her neurologist to freeze the occipital nerves which has helped improve her migraines. She has previously had the occipital nerves burned and blocked as well. She has a history of idiopathic intracranial hypertension but denies any current exacerbation or worsening of symptoms.    She was previously seeing an [facility] doctor for ADHD management who has since left the practice. She is now being treated by Dr. Garay at [facility], whom she has seen twice so far. She denies any issues with current ADHD medication supply lasting at least a month. One Adderall prescription is unable to be filled until July 14th.    Bupropion 300 mg daily. Ibuprofen OTC around menstrual cycle, approximately once a month. Previously prescribed propranolol for palpitations but stopped after a few doses as palpitations resolved. Denies currently taking Celebrex. Prescribed Adderall for ADHD.    Up to date on mammogram and Pap tests. Due for colon cancer screening soon. Needs latest COVID-19 booster and flu vaccine for upcoming flu season.      "  Assessment & Plan      Cardiovascular System:  - Further evaluate recent palpitations and family history of PFO with echocardiogram; consider longer cardiac monitoring if palpitations persist weekly.  - History of frequent premature beats on 48-hour Holter monitor in 2021; never saw a cardiologist.  - Recommend longer 1-week Holter monitor if weekly palpitations persist; E-consult cardiology if results are abnormal.  - Sibling and grandfather had PFOs; order echocardiogram to evaluate for PFO.  - Heart was regular on exam, with no murmur or irregular heartbeats.    Neurological System:  - History of migraines, recent successful occipital nerve freeze procedure.  - Migraines worsened after meningitis in 2010.  - Neurologist performed successful occipital nerve freeze procedure.    Medication Management:  - Reviewed medications, noted duplicate bupropion 300 mg entries, one not filled.  - Continued bupropion 300 mg, Adderall for ADHD.  - Offered to manage ADHD medication.  - Discontinued ibuprofen prescription, advised to take OTC as needed around menstrual cycle.    Health Maintenance:  - Up to date on health maintenance except for COVID booster and colon cancer screening.  - Advised to get COVID booster and flu vaccine together.    Follow-up:  - Alicia is to schedule virtual video visit. Plan to address palpitations, PFO evaluation, and other issues during that appointment.    ASSESSMENT/PLAN  1. Preventative health care    2. IIH (idiopathic intracranial hypertension)  Assessment & Plan:  ASSESSMENT: Clinically stable.  PLAN: Continue present treatment plan.       3. Palpitations    4. Family history of cardiac disorder (PFO)    5. ADHD, predominantly inattentive type      PHYSICAL EXAM  Vitals:    06/21/24 0853   BP: 120/73   Pulse: 87   Resp: 18   Temp: 97.8 °F (36.6 °C)   SpO2: 100%   Weight: 107 kg (235 lb 14.3 oz)   Height: 5' 9" (1.753 m)   Physical Exam  Vitals reviewed.   Constitutional:       General: She " is not in acute distress.     Appearance: Normal appearance. She is not ill-appearing, toxic-appearing or diaphoretic.   HENT:      Head: Normocephalic and atraumatic.      Right Ear: Tympanic membrane, ear canal and external ear normal.      Left Ear: Tympanic membrane, ear canal and external ear normal.   Eyes:      General: No scleral icterus.     Conjunctiva/sclera: Conjunctivae normal.   Neck:      Vascular: No carotid bruit.   Cardiovascular:      Rate and Rhythm: Normal rate and regular rhythm.      Heart sounds: Normal heart sounds.   Pulmonary:      Effort: Pulmonary effort is normal.      Breath sounds: Normal breath sounds.   Abdominal:      General: Bowel sounds are normal. There is no distension.      Palpations: Abdomen is soft. There is no mass.      Tenderness: There is no abdominal tenderness.   Musculoskeletal:         General: No tenderness.      Cervical back: No muscular tenderness.   Lymphadenopathy:      Cervical: No cervical adenopathy.   Skin:     General: Skin is warm and dry.      Coloration: Skin is not jaundiced.   Neurological:      General: No focal deficit present.      Mental Status: She is alert and oriented to person, place, and time.      Cranial Nerves: No cranial nerve deficit.      Gait: Gait normal.   Psychiatric:         Mood and Affect: Mood normal.         Behavior: Behavior normal.         Judgment: Judgment normal.         MEDICATIONS  Alicia has a current medication list which includes the following prescription(s): bupropion, dextroamphetamine-amphetamine, emgality pen, onabotulinumtoxina, and nurtec.    HEALTH MAINTENANCE AND SCREENINGS - UP TO DATE  Health Maintenance Topics with due status: Not Due       Topic Last Completion Date    TETANUS VACCINE 03/01/2018    Influenza Vaccine 12/06/2022    Cervical Cancer Screening 06/03/2024    Mammogram 06/14/2024    Hemoglobin A1c (Diabetic Prevention Screening) 06/21/2024     HEALTH MAINTENANCE AND SCREENINGS - DUE OR DUE  SOON  Health Maintenance Due   Topic Date Due    COVID-19 Vaccine (3 - 2023-24 season) 09/01/2023     FOLLOW-UP  Alicia is to follow up with me for any health problems or concerns, any abnormal test results, and any age-appropriate health maintenance interventions and screenings that may be due.    PROBLEM LIST  Alicia has Chronic daily headache; Bilateral occipital neuralgia; Chronic migraine without aura without status migrainosus, not intractable; IIH (idiopathic intracranial hypertension); Vitamin D deficiency; DDD (degenerative disc disease), cervical; Cervical radiculopathy; Osteoarthritis of spine; Cervical dystonia; Chronic pain; Anxiety and depression; Attention deficit; Frequent unifocal PVCs; Left ureteral stone; History of kidney stones; Adjustment disorder with depressed mood; ADHD, predominantly inattentive type; Acute left ankle pain; Left leg pain; Family history of cardiac disorder (PFO); and Palpitations on their problem list.    PAST MEDICAL HISTORY  Alicia has a past medical history of Allergy, Generalized headaches, Herpes zoster without complication, History of concussion, History of kidney stones, Migraine headache, and Vitamin D deficiency.    SURGICAL HISTORY  Alicia has a past surgical history that includes Breast surgery; Injection of anesthetic agent around nerve (Bilateral, 04/16/2019); Injection of anesthetic agent around nerve (Right, 09/11/2021); Arthroscopic repair of rotator cuff of shoulder (10/12/2021); and Radiofrequency ablation (Bilateral, 04/08/2022).    FAMILY HISTORY  Alicia family history includes Breast cancer in her maternal aunt; Breast cancer (age of onset: 42) in her mother; Cancer in her maternal grandfather; Cancer (age of onset: 42) in her mother; Depression in her brother and paternal grandmother; Diabetes in her brother; Heart disease in her brother; Hyperlipidemia in her mother.     ALLERGIES  Alicia reports she is allergic to imitrex [sumatriptan succinate].    SOCIAL  "HISTORY  Alicia  reports that she has never smoked. She has been exposed to tobacco smoke. She has never used smokeless tobacco. She reports that she does not currently use alcohol. She reports that she does not use drugs.     This note was generated with the assistance of ambient listening technology. Verbal consent was obtained by the patient and accompanying visitor(s) for the recording of patient appointment to facilitate this note. I attest to having reviewed and edited the generated note for accuracy, though some syntax or spelling errors may persist. Please contact the author of this note for any clarification.    Documentation entered by me for this encounter may have been done in part using speech-recognition technology. Although I have made an effort to ensure accuracy, "sound like" errors may exist and should be interpreted in context.  "

## 2024-06-21 NOTE — LETTER
Dear Alicia,    It was a pleasure seeing you for your recent Executive Health Exam. Im glad we had the opportunity to discuss your health in detail.    Heres a summary of your exam results:    Blood Pressure, Weight, and BMI Trends  Your blood pressure readings have been mostly within the normal range, with the most recent reading being 120/73 mmHg. Your weight has fluctuated slightly, with the most recent measurement at 235 lb 14.3 oz. Your BMI has also shown some variation, currently at 34.84 kg/m². Maintaining or losing weight through a balanced diet and regular exercise can help improve these numbers.    Tobacco and Alcohol Use  Youve never smoked and have only past passive exposure to tobacco, which is excellent. Your alcohol use is minimal, with occasional consumption of one or two drinks once a month, which is also very good.    Social Determinants of Health  - Tobacco Use: Low risk, never smoked.  - Alcohol Use: Not at risk, minimal consumption.  - Financial Resource Strain: Low risk, no difficulty in paying living expenses.  - Food Insecurity: No issues.  - Transportation Needs: No issues.  - Physical Activity: Insufficiently active, exercising 4 days a week for 30 minutes each.  - Stress: Some stress present.  - Housing Stability: Stable housing.  - Depression: Low risk, minimal symptoms reported.    Lab Results  Your lab results are all fine mostly within reference ranges. Here are a few specifics:  - CBC Panel: Slightly low albumin (3.4 g/dL) and CO2 (22 mmol/L), low MCH (26.0 pg), MCHC (31.8 g/dL), and slightly high RDW (14.9%).  - Lipid Panel: Total cholesterol is slightly high (206 mg/dL), but your HDL (good cholesterol) is excellent at 58 mg/dL.  - Other Labs: TSH, Hemoglobin A1C, and glucose are all within normal ranges.    Historical Lab Data  Your historical lab data shows stable hemoglobin and hematocrit levels. Your cholesterol levels have increased slightly, but HDL remains good. Other values  are stable.    Other Test Results  Your recent EKG showed normal results, with no significant changes compared to previous tests.    Health Maintenance  You are up to date on most health maintenance items, including your tetanus vaccine, cervical cancer screening, mammogram, and hemoglobin A1c test. The only item due soon is your COVID-19 booster.    Recommendations  - Continue your current medications as prescribed.  - Increase physical activity if possible.  - Get your COVID-19 booster.  - Schedule regular check-ups to monitor your health.    I look forward to continuing our discussion at your upcoming virtual video visit on Friday, July 12th at 12:20 PM.    Thank you for trusting [facility] and me with your healthcare.    Warmest regards,     TANIA Hutchison MD    P. S. We want you to have a excellent experience with your upcoming virtual visit, so here are some tips:  Ensure that you have the latest version of the [facility] installed if you use your mobile device for your virtual visit.  You can learn more about [facility] at [email or web address]. There you'll also find instructions and a short YouTube video.  Check-in for your virtual visit appointment at least 10-15 minutes early to allow time to complete e-Precheck and troubleshoot any technical problems.

## 2024-06-22 LAB
OHS QRS DURATION: 80 MS
OHS QTC CALCULATION: 453 MS

## 2024-06-23 LAB
OHS QRS DURATION: 80 MS
OHS QTC CALCULATION: 453 MS

## 2024-07-04 PROBLEM — G93.2 IIH (IDIOPATHIC INTRACRANIAL HYPERTENSION): Chronic | Status: ACTIVE | Noted: 2017-05-09

## 2024-07-04 PROBLEM — R00.2 PALPITATIONS: Status: ACTIVE | Noted: 2024-07-04

## 2024-07-04 PROBLEM — Z82.49 FAMILY HISTORY OF CARDIAC DISORDER: Status: ACTIVE | Noted: 2024-07-04

## 2024-07-12 ENCOUNTER — OFFICE VISIT (OUTPATIENT)
Dept: INTERNAL MEDICINE | Facility: CLINIC | Age: 44
End: 2024-07-12
Payer: COMMERCIAL

## 2024-07-12 ENCOUNTER — PATIENT MESSAGE (OUTPATIENT)
Dept: INTERNAL MEDICINE | Facility: CLINIC | Age: 44
End: 2024-07-12

## 2024-07-12 DIAGNOSIS — F33.0 RECURRENT MILD MAJOR DEPRESSIVE DISORDER WITH ANXIETY: Chronic | ICD-10-CM

## 2024-07-12 DIAGNOSIS — F41.9 RECURRENT MILD MAJOR DEPRESSIVE DISORDER WITH ANXIETY: Chronic | ICD-10-CM

## 2024-07-12 DIAGNOSIS — F90.0 ADHD, PREDOMINANTLY INATTENTIVE TYPE: Chronic | ICD-10-CM

## 2024-07-12 DIAGNOSIS — R00.2 PALPITATIONS: Primary | Chronic | ICD-10-CM

## 2024-07-12 PROCEDURE — G2211 COMPLEX E/M VISIT ADD ON: HCPCS | Mod: 95,,, | Performed by: FAMILY MEDICINE

## 2024-07-12 PROCEDURE — 99214 OFFICE O/P EST MOD 30 MIN: CPT | Mod: 95,,, | Performed by: FAMILY MEDICINE

## 2024-07-12 RX ORDER — DEXTROAMPHETAMINE SACCHARATE, AMPHETAMINE ASPARTATE MONOHYDRATE, DEXTROAMPHETAMINE SULFATE AND AMPHETAMINE SULFATE 5; 5; 5; 5 MG/1; MG/1; MG/1; MG/1
20 CAPSULE, EXTENDED RELEASE ORAL EVERY MORNING
Qty: 30 CAPSULE | Refills: 0 | Status: SHIPPED | OUTPATIENT
Start: 2024-08-09

## 2024-07-12 RX ORDER — BUPROPION HYDROCHLORIDE 300 MG/1
300 TABLET ORAL EVERY MORNING
Qty: 90 TABLET | Refills: 3 | Status: SHIPPED | OUTPATIENT
Start: 2024-07-12

## 2024-07-12 RX ORDER — DEXTROAMPHETAMINE SACCHARATE, AMPHETAMINE ASPARTATE MONOHYDRATE, DEXTROAMPHETAMINE SULFATE AND AMPHETAMINE SULFATE 5; 5; 5; 5 MG/1; MG/1; MG/1; MG/1
20 CAPSULE, EXTENDED RELEASE ORAL EVERY MORNING
Qty: 30 CAPSULE | Refills: 0 | Status: SHIPPED | OUTPATIENT
Start: 2024-07-12

## 2024-07-12 NOTE — PROGRESS NOTES
TELEMEDICINE VIRTUAL VIDEO VISIT  7/12/24 12:20 PM CDT    Visit Type: Audiovisual    Patient's Location: Alicia represents that they are located within the state of [place].    History of Present Illness    Alicia presents today for follow up.    She reports ongoing palpitations, though they have decreased in frequency and duration. She now experiences palpitations daily, lasting for approximately one second. She denies any prolonged episodes. She had a Holter monitor in 2021 which showed frequent premature beats, but she did not follow up with a cardiologist after these abnormal findings. She discontinued her Adderall medication due to anxiety about its potential impact on her heart, despite taking it for three years without worsening of palpitations. She acknowledges that her concerns may be largely psychological, triggered by her brother's stroke and her sister's PFO-related surgery.    She reports a family history of patent foramen ovale (PFO). Multiple family members have been diagnosed with PFO as adults. She has not been diagnosed with PFO herself but expresses concern about the hereditary aspect of the condition and desires evaluation to rule it out. She denies having had an echocardiogram in the past.    She is currently taking bupropion 300 mg daily, which she reports has a good effect on her mood and anxiety, making her feel more level and reducing random crying and nervousness. She is no longer taking propranolol as the continuous palpitations that initially prompted its use have subsided. She expresses a preference for Adderall extended-release capsules over tablets but is not currently taking it due to cardiac concerns.       Review of Systems   Constitutional:  Negative for activity change.   HENT:  Negative for hearing loss and trouble swallowing.    Eyes:  Negative for discharge.   Respiratory:  Negative for chest tightness and wheezing.    Cardiovascular:  Negative for chest pain and palpitations.    Gastrointestinal:  Negative for constipation, diarrhea and vomiting.   Genitourinary:  Negative for difficulty urinating and hematuria.   Neurological:  Negative for headaches.   Psychiatric/Behavioral:  Negative for dysphoric mood.        Assessment & Plan     Patient has been having palpitations, occurring about daily for a short duration of a second. Palpitations are less frequent and shorter in duration compared to previously.   Patient's cardiac monitor in 2021 showed frequent premature beats but no concerning findings. Patient has never had an echocardiogram.   Patient has multiple family members with patent foramen ovale (PFO) but has never been diagnosed with PFO herself.   Plan is to obtain a repeat cardiac monitor for at least 3 days and an echocardiogram with saline bubble test to assess for structural heart abnormalities and rule out significant cardiac rhythm problems. If testing is unremarkable, this will place patient in a low-risk category for having a significant cardiac problem.   Patient stopped taking Adderall due to concerns about it being a stimulant in the context of her palpitations and family history, though she does not feel the Adderall makes her palpitations worse. Reasonable to restart Adderall after cardiac testing if there are no contraindications.  R00.2 PALPITATIONS:   Advised the patient to continue taking Adderall as long as it is well-tolerated and not worsening palpitations, but to hold if it is making palpitations worse in any way.   The patient reports experiencing palpitations daily, lasting for about a second.   The patient had a Holter monitor in 2021 that showed frequent premature beats.   EKG was normal.   Previous cardiac monitor in 2021 had minor abnormal findings but no red flags.   Recommends a repeat cardiac monitor for a minimum of 3 days and an echocardiogram to assess heart structure.   Orders an echocardiogram with a saline bubble test and a 3-day cardiac  monitor.  F41.9 ANXIETY DISORDER, UNSPECIFIED:   Continued bupropion 300 mg daily each morning and provided refills as it has been working well for mood and anxiety without side effects.   The patient reports feeling nervous about taking Adderall due to concerns about heart issues.   The patient reports feeling more level with bupropion, with less ups and downs and random crying.   The patient is taking bupropion 300mg daily for mood and anxiety management.  F90.9 ATTENTION-DEFICIT HYPERACTIVITY DISORDER, UNSPECIFIED TYPE:   Started Adderall 20 mg extended-release capsules.   Provided 1 prescription to fill now and 1 to fill in 4 weeks.   The patient has been on Adderall for ADHD treatment for about 3 years.   Discussed non-stimulant medication option (Strattera) but noted it's generally less effective than Adderall.   Prescribed Adderall 20mg extended-release capsules, with instructions to continue if not worsening palpitations.  Z82.49 FAMILY HISTORY OF ISCHEMIC HEART DISEASE AND OTHER DISEASES OF THE CIRCULATORY SYSTEM:   The patient has multiple family members with patent foramen ovale (PFO) as adults.   Acknowledges patient's concern about hereditary aspect of PFO.   Orders an echocardiogram with a saline bubble test to check for PFO.  I49.40 UNSPECIFIED PREMATURE DEPOLARIZATION:   Ordered echocardiogram with saline bubble test.   Ordered cardiac monitor for a minimum of 3 days.   Scheduled follow up in 1 week after completing the cardiac tests for a virtual video visit to go over the results.   Advised the patient to call appointment desk to schedule the ordered echocardiogram and cardiac monitor.   Discontinued propranolol as patient reports she is no longer taking it and does not find it helpful.   The patient had a Holter monitor in 2021 that showed frequent premature beats.   Previous cardiac monitor in 2021 had minor abnormal findings but no red flags.   Orders a new 3-day cardiac monitor to reassess the  condition.       1. Palpitations  -     Echo Saline Bubble? Yes; Ultrasound enhancing contrast? No; Future  -     Cardiac Monitor - 3-15 Day Adult ([facility] Only); Future    2. Recurrent mild major depressive disorder with anxiety  -     buPROPion (WELLBUTRIN XL) 300 MG 24 hr tablet; Take 1 tablet (300 mg total) by mouth every morning.  Dispense: 90 tablet; Refill: 3    3. ADHD, predominantly inattentive type  -     dextroamphetamine-amphetamine (ADDERALL XR) 20 MG 24 hr capsule; Take 1 capsule (20 mg total) by mouth every morning.  Dispense: 30 capsule; Refill: 0  -     dextroamphetamine-amphetamine (ADDERALL XR) 20 MG 24 hr capsule; Take 1 capsule (20 mg total) by mouth every morning.  Dispense: 30 capsule; Refill: 0    No other significant complaints or concerns were reported.  Today's visit involved the intricate management of episodic problem(s) and the ongoing care for the patient's serious or complex condition(s) listed above, reflecting the inherent complexity of providing longitudinal, comprehensive evaluation and management as the central hub for the patient's primary care services.  There were no vitals filed for this visit.  Physical Exam    Constitutional: No acute distress. Normal appearance. Not ill-appearing.  Pulmonary: Pulmonary effort is normal. No respiratory distress.  Skin: Skin is not jaundiced.  Neurological: Alert. Mental status is at baseline.  Psychiatric: Mood normal. Behavior normal. Thought content normal.         This note was generated with the assistance of ambient listening technology. Verbal consent was obtained by the patient and accompanying visitor(s) for the recording of patient appointment to facilitate this note. I attest to having reviewed and edited the generated note for accuracy, though some syntax or spelling errors may persist. Please contact the author of this note for any clarification.      I spent a total of 22 minutes today evaluating and managing this patient for  "this encounter.  This includes face to face time and non-face to face time preparing to see the patient (eg, review of tests), obtaining and/or reviewing separately obtained history, documenting clinical information in the electronic or other health record, independently interpreting results and communicating results to the patient/family/caregiver, or care coordinator. This time was exclusive of any separately billable procedures for this patient and exclusive of time spent treating any other patient.    Documentation entered by me for this encounter may have been done in part using speech-recognition technology. Although I have made an effort to ensure accuracy, "sound like" errors may exist and should be interpreted in context.    Each patient to whom medical services are provided by telemedicine is: (1) informed of the relationship between the physician and patient and the respective role of any other health care provider with respect to management of the patient; and (2) notified that he or she may decline to receive medical services by telemedicine and may withdraw from such care at any time.   "

## 2024-07-15 ENCOUNTER — PATIENT MESSAGE (OUTPATIENT)
Dept: CARDIOLOGY | Facility: HOSPITAL | Age: 44
End: 2024-07-15
Payer: COMMERCIAL

## 2024-07-17 ENCOUNTER — PATIENT MESSAGE (OUTPATIENT)
Dept: CARDIOLOGY | Facility: HOSPITAL | Age: 44
End: 2024-07-17
Payer: COMMERCIAL

## 2024-07-19 ENCOUNTER — PATIENT MESSAGE (OUTPATIENT)
Dept: CARDIOLOGY | Facility: HOSPITAL | Age: 44
End: 2024-07-19
Payer: COMMERCIAL

## 2024-07-29 ENCOUNTER — TELEPHONE (OUTPATIENT)
Dept: NEUROLOGY | Facility: CLINIC | Age: 44
End: 2024-07-29
Payer: COMMERCIAL

## 2024-07-29 NOTE — TELEPHONE ENCOUNTER
Spoke to Pt. Pt will be out of town. Pt's Botox appt will be rescheduled for August 2. Pt was informed that she has other appts at this time. Pt stated she will reschedule those appt for this one.     ----- Message from Virginia Stephens sent at 7/29/2024  8:21 AM CDT -----  Regarding: Appt  Contact: pt  [phone]  Pt is scheduled for Botox injection 7/30, would like to reschedule for next available, please call pt @[phone]

## 2024-08-01 ENCOUNTER — PATIENT MESSAGE (OUTPATIENT)
Dept: CARDIOLOGY | Facility: HOSPITAL | Age: 44
End: 2024-08-01
Payer: COMMERCIAL

## 2024-08-01 NOTE — PROGRESS NOTES
New Patient     SUBJECTIVE:  Patient ID: Alicia Teixeira   MRN: [id number]  Referred By: Dr. Rick Chicas III  Chief Complaint: No chief complaint on file.      History of Present Illness:   43 y.o. female with chronic migraines, BON, IIH, cervical dystonia and radiculopathy, palpitations/PVCs, adhd, depression, kidney stones, DDD, osteoarthritis, who presents to clinic alone for evaluation of headaches.     Patient is a previous patient of Dr. Chicas, transferring care to me for treatment of chronic migraines. Current regimen includes: Emgality, Botox (last 5/6/24), nurtec    Reports that botox + Emgality has reduced her headache burden by more than 50%, from 30/30 days to 5/30 days and intensity from 10/10 to 5/10.     Patient is also established with pain management, s/p RFA in 5/2019. She then had Iovera with Dr. Chicas on 5/30/24.     PMHx negative for TBI, Meningitis, Aneurysms, Kidney Stones, asthma, GI bleed, osteoporosis, CAD/MI, CVA/TIA, DM, cancer, pregnancy       Headache History:  Onset - child  Previous Hx of HA -   Location/Radiation - L periorbital, bilateral occipitalis and radiates forward with ON  Quality - throbbing, stabbing, aching   Duration - 1 day - 2 weeks   Intensity (range) - 5-9/10 ( higher when botox wearing off)  Frequency - 2/30 ha days per month, 1/30 are debilitating  Triggers - ovulation/menstruation, seafood  Aggravating Factors - light, sound  Alleviating Factors - lying down, dark cold room, ice  Recent Changes - improving  Prodrome/Aura - yes, vertical split/shift in vision and black spots  HA today? - no  Time of day of most headaches- anytime, can wake up with headache   Sleep - no snoring, wakes feeling refreshed, resolution of headache with sleep    Associated symptoms with the headache:   Meningeal symptoms - photophobia, phonophobia, exercise intolerance   Nausea/vomitting  Nasal drainage   Visual blurriness   Pallor/flushing  Dizziness   Vertigo  Confusion  Impaired  concentration   Pain worsened with physical activity   Neck pain       Treatments Tried   Anti-inflammatories   Maxalt   Imitrex   Axert   Anti-seizure medications  Anti-depressants  Anti-HTN  Aimovig   Emgality* - still taking   Sprix   Nurtec*   **no topamax s/t kidney stones    Social History  Alcohol - denies  Smoke - denies  Recreational Drug Use- denies    Current Medications:    Current Outpatient Medications:     buPROPion (WELLBUTRIN XL) 300 MG 24 hr tablet, Take 1 tablet (300 mg total) by mouth every morning., Disp: 90 tablet, Rfl: 3    dextroamphetamine-amphetamine (ADDERALL XR) 20 MG 24 hr capsule, Take 1 capsule (20 mg total) by mouth every morning., Disp: 30 capsule, Rfl: 0    [START ON 8/9/2024] dextroamphetamine-amphetamine (ADDERALL XR) 20 MG 24 hr capsule, Take 1 capsule (20 mg total) by mouth every morning., Disp: 30 capsule, Rfl: 0    galcanezumab-gnlm (EMGALITY PEN) 120 mg/mL PnIj, Inject 120 mg into the skin every 28 days., Disp: 1 mL, Rfl: 12    onabotulinumtoxinA (BOTOX INJ), Inject as directed. For migraines, Disp: , Rfl:     rimegepant (NURTEC) 75 mg odt, Take 1 tablet (75 mg total) by mouth daily as needed for Migraine (once per 48 hours). Place ODT tablet on the tongue; alternatively the ODT tablet may be placed under the tongue, Disp: 8 tablet, Rfl: 12    Review of Systems - as per HPI, otherwise a balanced 10 systems review is negative.    OBJECTIVE:  Vitals:  There were no vitals taken for this visit.    Physical Exam   Constitutional: she appears well-developed and well-nourished. she is well groomed. NAD  HENT:    Head: Normocephalic and atraumatic, Frontalis was NTTP, temporalis was NTTP   Eyes: Conjunctivae and EOM are normal. Pupils are equal, round, and reactive to light   Neck: Neck supple. Occiput and trapezius NTTP   Musculoskeletal: Normal range of motion. No joint stiffness. No vertebral point tenderness.  Skin: Skin is warm and dry.  Psychiatric: Normal mood and affect.      Neuro exam:    Mental status:  The patient is alert and oriented to person, place and time.  Language is intact and fluent  Remote and recent memory are intact  Normal attention and concentration  Mood is stable    Cranial Nerves:  Fundoscopic examination does not reveal any occult papilledema.    Pupils are equal and reactive to light.    Extraocular movements are intact and without nystagmus.    Visual fields are full to confrontation testing.   Facial movement is symmetric.  Facial sensation is intact.    Hearing is intact   FROM of neck in all (6) directions without pain  Shoulder shrug symmetrical.    Coordination:     Finger to nose - normal and symmetric bilaterally   Heel to shin test - normal and symmetric bilaterally     Motor:  Normal muscle bulk and symmetry. No fasciculations were noted.   Tremor not apparent   Pronator drift not apparent.    strength was strong and symmetric  Finger extension strength was strong and symmetric  RUE:appropriate against gravity and medium force as tested 5/5  LUE: appropriate against gravity and medium force as tested 5/5  RLE:appropriate against gravity and medium force as tested 5/5              LLE: appropriate against gravity and medium force as tested 5/5    Reflexes:  Right Brachioradialis 2+  Left Brachioradialis 2+  Right Biceps 2+  Left Biceps 2+  Right Patellar2+  Left Patellar 2+      Sensory:  RUE  intact light touch  LUE intact light touch  RLE intact light touch  LLE intact light touch    Gait:   Romberg - negative  Normal gait  Tandem, Heel, and Toe Walk - able to perform without difficulty    Review of Data:   Notes from neurology reviewed   Labs:  Hospital Outpatient Visit on 06/21/2024   Component Date Value Ref Range Status    QRS Duration 06/21/2024 80  ms Final    [facility] QTC Calculation 06/21/2024 453  ms Final   Clinical Support on 06/21/2024   Component Date Value Ref Range Status    Sodium 06/21/2024 141  136 - 145 mmol/L Final    Potassium  06/21/2024 4.4  3.5 - 5.1 mmol/L Final    Chloride 06/21/2024 109  95 - 110 mmol/L Final    CO2 06/21/2024 22 (L)  23 - 29 mmol/L Final    Glucose 06/21/2024 89  70 - 110 mg/dL Final    BUN 06/21/2024 12  6 - 20 mg/dL Final    Creatinine 06/21/2024 0.9  0.5 - 1.4 mg/dL Final    Calcium 06/21/2024 9.2  8.7 - 10.5 mg/dL Final    Total Protein 06/21/2024 6.9  6.0 - 8.4 g/dL Final    Albumin 06/21/2024 3.4 (L)  3.5 - 5.2 g/dL Final    Total Bilirubin 06/21/2024 0.4  0.1 - 1.0 mg/dL Final    Alkaline Phosphatase 06/21/2024 103  55 - 135 U/L Final    AST 06/21/2024 12  10 - 40 U/L Final    ALT 06/21/2024 11  10 - 44 U/L Final    eGFR 06/21/2024 >60  >60 mL/min/1.73 m^2 Final    Anion Gap 06/21/2024 10  8 - 16 mmol/L Final    WBC 06/21/2024 6.94  3.90 - 12.70 K/uL Final    RBC 06/21/2024 4.65  4.00 - 5.40 M/uL Final    Hemoglobin 06/21/2024 12.1  12.0 - 16.0 g/dL Final    Hematocrit 06/21/2024 38.0  37.0 - 48.5 % Final    MCV 06/21/2024 82  82 - 98 fL Final    MCH 06/21/2024 26.0 (L)  27.0 - 31.0 pg Final    MCHC 06/21/2024 31.8 (L)  32.0 - 36.0 g/dL Final    RDW 06/21/2024 14.9 (H)  11.5 - 14.5 % Final    Platelets 06/21/2024 361  150 - 450 K/uL Final    MPV 06/21/2024 10.9  9.2 - 12.9 fL Final    Cholesterol 06/21/2024 206 (H)  120 - 199 mg/dL Final    Triglycerides 06/21/2024 105  30 - 150 mg/dL Final    HDL 06/21/2024 58  40 - 75 mg/dL Final    LDL Cholesterol 06/21/2024 127.0  63.0 - 159.0 mg/dL Final    HDL/Cholesterol Ratio 06/21/2024 28.2  20.0 - 50.0 % Final    Total Cholesterol/HDL Ratio 06/21/2024 3.6  2.0 - 5.0 Final    Non-HDL Cholesterol 06/21/2024 148  mg/dL Final    TSH 06/21/2024 2.408  0.400 - 4.000 uIU/mL Final    Hemoglobin A1C 06/21/2024 5.2  4.0 - 5.6 % Final    Estimated Avg Glucose 06/21/2024 103  68 - 131 mg/dL Final   Office Visit on 06/19/2024   Component Date Value Ref Range Status    . 06/19/2024 Comment   Final    DIAGNOSIS 06/19/2024 Comment   Final    . 06/19/2024 Comment   Final    .  06/19/2024 Comment   Final    . 06/19/2024 Comment   Final    . 06/19/2024 Comment   Final    . 06/19/2024 Comment   Final   Office Visit on 06/03/2024   Component Date Value Ref Range Status    DIAGNOSIS: 06/03/2024 Comment   Final    Specimen adequacy: 06/03/2024 Comment   Final    Clinician Provided ICD10 06/03/2024 Comment   Final    Performed by: 06/03/2024 Comment   Final    . 06/03/2024 .   Final    Note: 06/03/2024 Comment   Final    Test Methodology: 06/03/2024 Comment   Final    HPV other High Risk types, PCR 06/03/2024 Not Detected  Not Detected Final    Chlamydia, Amplified DNA 06/03/2024 Not Detected  Not Detected Final    N gonorrhoeae, amplified DNA 06/03/2024 Not Detected  Not Detected Final    Trichomonas vaginalis 06/03/2024 Not Detected  Not Detected Final   Office Visit on 05/21/2024   Component Date Value Ref Range Status    QRS Duration 05/21/2024 88  ms Final    [facility] QTC Calculation 05/21/2024 466  ms Final     Imaging:  Results for orders placed or performed during the hospital encounter of 12/12/19   MRI Brain Without Contrast    Narrative    EXAMINATION:  MRI BRAIN WITHOUT CONTRAST    CLINICAL HISTORY:  Confusion/delirium, altered LOC, unexplained;.  Disorientation, unspecified    TECHNIQUE:  Multiplanar multisequence MR imaging of the brain was performed without contrast.    COMPARISON:  CT 12/16/2011; MRI 12/15/2011    FINDINGS:  Intracranial compartment:    Ventricles are stable in size without evidence of hydrocephalus. No extra-axial blood or fluid collections.    The brain parenchyma appears normal. No mass lesion, acute hemorrhage, edema or acute infarct.    Normal vascular flow voids are preserved.    Skull/extracranial contents (limited evaluation): Bone marrow signal intensity is normal.      Impression    No intracranial abnormality identified.    Electronically signed by resident: Andrea Abdullahi  Date:    12/12/2019  Time:    14:41    Electronically signed by: Jaquan Kwok  MD  Date:    12/12/2019  Time:    14:57   Results for orders placed or performed during the hospital encounter of 12/16/11   MRI Brain W WO Contrast    Narrative    DATE OF EXAM: Dec 15 2011      MRI   0007  -  MRI BRAIN W AND WO CONTRAST:   \  [id number]     CLINICAL HISTORY:   \HA S/P LP     PROCEDURE COMMENT:   \     ICD 9 CODE(S):   (\)     CPT 4 CODE(S)/MODIFIER(S):   (\)     Procedure: MRI the brain with andwithout contrast.     Technique: Sagittal and axial T1, axial T2, axial FLAIR, axial gradient,   axial diffusion, and axial, sagittal, and coronal postcontrast T1 images   of the whole brain.  16 cc of Omni scan gadolinium contrast was   administered.           Comparison: None     Findings: The brain parenchyma is normal in signal and contour. There are   no abnormal areas of parenchymal signal or enhancement. There are no   areas are restricted diffusion to suggest acute infarction. The   ventricles are normal in size and configuration without evidence for   hydrocephalus. There are several regions of the gradient susceptibility   within the ventricular system and cisterns.  This is suggestive of   pneumocephalus in this patient who is status post recent lumbar puncture.   No parenchymal gradient susceptibility is seen to suggest acute   hemorrhage.  The major intracranial T2  flow-voids are present.        Impression: Mild scattered pneumocephalus, noting patient had recent LP   procedure.         A preliminary report was given to ZAYNAB HORTON MD on 12/15/2011 at   8:13 PM by Delvin Pendleton.  ______________________________________      Electronically signed by resident: Lázaro Tapia MD  Date:     12/16/11  Time:    08:34         ______________________________________      Electronically signed by: Robert Lentz MD  Date:     12/16/11  Time:    14:47            : DELON  Transcribe Date/Time: Dec 16 2011  2:48P  Dictated by : LÁZARO TAPIA MD  Read On: Dec 16 2011  8:35A  \  Images were  reviewed, findings were verified and document was   electronically  SIGNED BY: ROBERT SHAH MD On: Dec 16 2011  2:48P   LÁZARO TAPIA MD   CT Head Without Contrast    Narrative    DATE OF EXAM: Dec 16 2011      CT    0027  -  HEAD WITHOUT CONTRAST:   \  [id number]     CLINICAL HISTORY:   \HEADACHE     PROCEDURE COMMENT:   \     ICD 9 CODE(S):   (\)     CPT 4 CODE(S)/MODIFIER(S):   (\)     Comparison: MRI from 12/15/2011.     Findings: 5-mm axial noncontrast CT images were obtained of the brain.    Runner and sagittal reformats was process.     The brain demonstrates normal architecture.  There is evidence of   pneumocephalus in the ventricular system and basal cisterns.  This likely   relates to the patient's recent history of lumbar puncture and   corresponds to the finding seen on MRI.  Otherwise no parenchymal mass,   hemorrhage, abnormal calcifications are seen.  No extra-axial masses,   fluid collections or hemorrhage is seen.  The calvarium is intact.  The   ventricular system is normal in size.  Sinuses are clear.        Impression: Mild scattered pneumocephalus, likely from recent lumbar   puncture.  ______________________________________      Electronically signed by resident: Lázaro Tapia MD  Date:     12/16/11  Time:    11:09         ______________________________________      Electronically signed by: Robert Shah MD  Date:     12/16/11  Time:    14:48            : DELON  Transcribe Date/Time: Dec 16 2011  2:48P  Dictated by : LÁZARO TAPIA MD  Read On: Dec 16 2011 11:09A  \  Images were reviewed, findings were verified and document was   electronically  SIGNED BY: ROBERT SHAH MD On: Dec 16 2011  2:48P   LÁZARO TAPIA MD     Note: I have independently reviewed any/all imaging/labs/tests and agree with the report (s) as documented.  Any discrepancies will be as noted/demarcated by free text.  ARNOLD HACKETT 8/1/2024    ASSESSMENT:  No diagnosis found.      PLAN:  - Discussed symptoms  appear to be consistent with chronic migraines, occipital neuralgia , discussed treatment options and patient agreed with the following plan:    - Patient reports a 'wearing off effect' prior to the subsequent BOTOX injections at 12 weeks. This occurs at week 11. Based on this information, it is medically necessary for the patient to receive BOTOX therapy   - Patient has continued to achieve a greater than 50% reduction in the frequency of their migraines with continued Botox injections.  Wishes to proceed with today's injections as scheduled.    - continue emgality, botox and nurtec as rescue. Medically necessary as patient has tried and failed many different medications (see above). Patient has seen greater than 50% improvement on these medications.     - risks, benefits, and potential side effects of botox discussed   - alternative treatment options offered   - importance of healthy diet, regular exercise and sleep hygiene in the treatment of headaches    - Start tracking headaches via Migraine Buddy olivia on phone   - RTC in 12 weeks for next botox.           I have discussed realistic goals of care with patient at length as well as medication options, and need for lifestyle adjustment. I have explained that treatment will take time. We have agreed that the goal will be to reduce frequency/intensity/quantity of HA, not to be completely HA free. I have explained my non narcotic policy regarding headache treatment.    Patient agreeable to work on lifestyle adjustments.    Discussed potential for teratogenicity with treatment, patient understands if her family planning status should change she will contact office immediately and we will safely adjust medications as needed.     Questions and concerns were sought and answered to the patient's stated verbal satisfaction.  The patient verbalizes understanding and agreement with the above stated treatment plan.     CC: SHEILA Hutchison MD Dr. Khan was available  during today's encounter.     PROCEDURE NOTE:  BOTOX was performed as an indicated therapy for intractable chronic migraine headaches given that the patient failed more than 2 headache medications. Patient has continued to achieve a greater than 50% reduction in the frequency of their migraines with continued Botox injections.  Wishes to proceed with today's injections as scheduled.      A time out was conducted just before the start of the procedure to verify the correct patient and procedure, procedure location, and all relevant critical information.      Botulinum Toxin Injection Procedure      PROCEDURE PERFORMED: Botulinum toxin injection (83218)  CLINICAL INDICATION: Chronic Migraines  After risks and benefits were explained including bleeding, infection, worsening of pain, damage to the areas being injected, weakness of muscles, loss of muscle control, dysphagia if injecting the head or neck, facial droop if injecting the facial area, painful injection, allergic or other reaction to the medications being injected, and the failure of the procedure to help the problem, a signed consent was obtained.   The patient was placed in a comfortable area and the sites to be treated were identified.The area to be treated was prepped three times with alcohol and the alcohol allowed to dry. Next, a 30 gauge needle was used to inject the medication in the area to be treated.      Total Botox used: 155 Units   Unavoidable waste: 45 Units      Injection sites:    muscle bilaterally ( a total of 10 units divided into 2 sites)   Procerus muscle (5 units)   Frontalis muscle bilaterally (a total of 20 units divided into 4 sites)   Temporalis muscle bilaterally (a total of 40 units divided into 8 sites)   Occipitalis muscle bilaterally (a total of 30 units divided into 6 sites)   Cervical paraspinal muscles (a total of 20 units divided into 4 sites)   Trapezius muscle bilaterally (a total of 30 units divided into 6  sites)     Complications: none   RTC for the next Botox injection: 12 weeks     Problem List Items Addressed This Visit    None           ARNOLD Cortez  [facility]   [phone]

## 2024-08-02 ENCOUNTER — PATIENT MESSAGE (OUTPATIENT)
Dept: CARDIOLOGY | Facility: HOSPITAL | Age: 44
End: 2024-08-02
Payer: COMMERCIAL

## 2024-08-02 ENCOUNTER — PROCEDURE VISIT (OUTPATIENT)
Dept: NEUROLOGY | Facility: CLINIC | Age: 44
End: 2024-08-02
Payer: COMMERCIAL

## 2024-08-02 VITALS
BODY MASS INDEX: 34.07 KG/M2 | HEART RATE: 98 BPM | WEIGHT: 230 LBS | SYSTOLIC BLOOD PRESSURE: 135 MMHG | HEIGHT: 69 IN | DIASTOLIC BLOOD PRESSURE: 88 MMHG

## 2024-08-02 DIAGNOSIS — G43.709 CHRONIC MIGRAINE WITHOUT AURA WITHOUT STATUS MIGRAINOSUS, NOT INTRACTABLE: Primary | ICD-10-CM

## 2024-08-06 ENCOUNTER — TELEPHONE (OUTPATIENT)
Dept: CARDIOLOGY | Facility: HOSPITAL | Age: 44
End: 2024-08-06
Payer: COMMERCIAL

## 2024-08-19 ENCOUNTER — HOSPITAL ENCOUNTER (OUTPATIENT)
Dept: CARDIOLOGY | Facility: HOSPITAL | Age: 44
Discharge: HOME | End: 2024-08-19
Attending: FAMILY MEDICINE
Payer: COMMERCIAL

## 2024-08-19 VITALS
WEIGHT: 230 LBS | HEIGHT: 69 IN | BODY MASS INDEX: 34.07 KG/M2 | SYSTOLIC BLOOD PRESSURE: 120 MMHG | DIASTOLIC BLOOD PRESSURE: 73 MMHG

## 2024-08-19 DIAGNOSIS — R00.2 PALPITATIONS: Chronic | ICD-10-CM

## 2024-08-19 LAB
AORTIC ROOT ANNULUS: 3.16 CM
AV INDEX (PROSTH): 0.68
AV MEAN GRADIENT: 3 MMHG
AV PEAK GRADIENT: 5 MMHG
AV REGURGITATION PRESSURE HALF TIME: 722.37 MS
AV VALVE AREA BY VELOCITY RATIO: 2.54 CM²
AV VALVE AREA: 2.23 CM²
AV VELOCITY RATIO: 0.78
BSA FOR ECHO PROCEDURE: 2.25 M2
CV ECHO LV RWT: 0.36 CM
DOP CALC AO PEAK VEL: 1.17 M/S
DOP CALC AO VTI: 23.1 CM
DOP CALC LVOT AREA: 3.3 CM2
DOP CALC LVOT DIAMETER: 2.04 CM
DOP CALC LVOT PEAK VEL: 0.91 M/S
DOP CALC LVOT STROKE VOLUME: 51.62 CM3
DOP CALC RVOT PEAK VEL: 0.72 M/S
DOP CALC RVOT VTI: 14.8 CM
DOP CALCLVOT PEAK VEL VTI: 15.8 CM
E/A RATIO: 0.97
E/E' RATIO: 7.06 M/S
ECHO LV POSTERIOR WALL: 0.97 CM (ref 0.6–1.1)
EJECTION FRACTION: 60 %
FRACTIONAL SHORTENING: 32 % (ref 28–44)
INTERVENTRICULAR SEPTUM: 1.1 CM (ref 0.6–1.1)
IVRT: 79.92 MSEC
LA MAJOR: 5.16 CM
LA MINOR: 4.47 CM
LA WIDTH: 3.6 CM
LEFT ATRIUM AREA SYSTOLIC (APICAL 2 CHAMBER): 18.86 CM2
LEFT ATRIUM AREA SYSTOLIC (APICAL 4 CHAMBER): 21.72 CM2
LEFT ATRIUM SIZE: 3.25 CM
LEFT ATRIUM VOLUME INDEX MOD: 28.2 ML/M2
LEFT ATRIUM VOLUME INDEX: 21.8 ML/M2
LEFT ATRIUM VOLUME MOD: 61.71 CM3
LEFT ATRIUM VOLUME: 47.64 CM3
LEFT INTERNAL DIMENSION IN SYSTOLE: 3.63 CM (ref 2.1–4)
LEFT VENTRICLE DIASTOLIC VOLUME INDEX: 62.51 ML/M2
LEFT VENTRICLE DIASTOLIC VOLUME: 136.89 ML
LEFT VENTRICLE END SYSTOLIC VOLUME APICAL 2 CHAMBER: 54.39 ML
LEFT VENTRICLE END SYSTOLIC VOLUME APICAL 4 CHAMBER: 62.03 ML
LEFT VENTRICLE MASS INDEX: 97 G/M2
LEFT VENTRICLE SYSTOLIC VOLUME INDEX: 25.4 ML/M2
LEFT VENTRICLE SYSTOLIC VOLUME: 55.54 ML
LEFT VENTRICULAR INTERNAL DIMENSION IN DIASTOLE: 5.33 CM (ref 3.5–6)
LEFT VENTRICULAR MASS: 211.77 G
LV LATERAL E/E' RATIO: 8.57 M/S
LV SEPTAL E/E' RATIO: 6 M/S
LVED V (TEICH): 136.89 ML
LVES V (TEICH): 55.54 ML
LVOT MG: 1.87 MMHG
LVOT MV: 0.65 CM/S
MV PEAK A VEL: 0.62 M/S
MV PEAK E VEL: 0.6 M/S
OHS CV RV/LV RATIO: 0.46 CM
PISA AR MAX VEL: 2.48 M/S
PISA TR MAX VEL: 2.58 M/S
PULM VEIN S/D RATIO: 1.45
PV MEAN GRADIENT: 1 MMHG
PV MV: 0.68 M/S
PV PEAK D VEL: 0.4 M/S
PV PEAK GRADIENT: 3 MMHG
PV PEAK S VEL: 0.58 M/S
PV PEAK VELOCITY: 0.84 M/S
RA MAJOR: 4.35 CM
RA PRESSURE ESTIMATED: 3 MMHG
RA WIDTH: 2.94 CM
RIGHT VENTRICULAR END-DIASTOLIC DIMENSION: 2.43 CM
RV TB RVSP: 6 MMHG
SINUS: 3.07 CM
STJ: 3.02 CM
TDI LATERAL: 0.07 M/S
TDI SEPTAL: 0.1 M/S
TDI: 0.09 M/S
TR MAX PG: 27 MMHG
TRICUSPID ANNULAR PLANE SYSTOLIC EXCURSION: 2.74 CM
TV REST PULMONARY ARTERY PRESSURE: 30 MMHG
Z-SCORE OF LEFT VENTRICULAR DIMENSION IN END DIASTOLE: -3.28
Z-SCORE OF LEFT VENTRICULAR DIMENSION IN END SYSTOLE: -1.69

## 2024-08-19 PROCEDURE — 93306 TTE W/DOPPLER COMPLETE: CPT | Mod: 26,,, | Performed by: INTERNAL MEDICINE

## 2024-08-19 PROCEDURE — 93306 TTE W/DOPPLER COMPLETE: CPT | Mod: PO

## 2024-08-19 NOTE — NURSING NOTE
Pt presented for an echocardiogram with bubble study per Dr. Hutchison.  Procedure was explained to the patient, she verbalized understanding.  IV, 22ga x 1 attempt, was started in the R AC using aseptic technique.  Patient tolerated the procedure well.  IV discontinued, pressure dressing applied.

## 2024-08-29 ENCOUNTER — PATIENT MESSAGE (OUTPATIENT)
Dept: INTERNAL MEDICINE | Facility: CLINIC | Age: 44
End: 2024-08-29
Payer: COMMERCIAL

## 2024-08-29 ENCOUNTER — TELEPHONE (OUTPATIENT)
Dept: INTERNAL MEDICINE | Facility: CLINIC | Age: 44
End: 2024-08-29
Payer: COMMERCIAL

## 2024-08-29 ENCOUNTER — OFFICE VISIT (OUTPATIENT)
Dept: INTERNAL MEDICINE | Facility: CLINIC | Age: 44
End: 2024-08-29
Payer: COMMERCIAL

## 2024-08-29 DIAGNOSIS — F90.0 ADHD, PREDOMINANTLY INATTENTIVE TYPE: Chronic | ICD-10-CM

## 2024-08-29 DIAGNOSIS — I51.7 LEFT VENTRICULAR DILATATION: Chronic | ICD-10-CM

## 2024-08-29 DIAGNOSIS — R00.2 PALPITATIONS: Primary | Chronic | ICD-10-CM

## 2024-08-29 PROCEDURE — G2211 COMPLEX E/M VISIT ADD ON: HCPCS | Mod: 95,,, | Performed by: FAMILY MEDICINE

## 2024-08-29 PROCEDURE — 99214 OFFICE O/P EST MOD 30 MIN: CPT | Mod: 95,,, | Performed by: FAMILY MEDICINE

## 2024-08-29 RX ORDER — DEXTROAMPHETAMINE SACCHARATE, AMPHETAMINE ASPARTATE MONOHYDRATE, DEXTROAMPHETAMINE SULFATE AND AMPHETAMINE SULFATE 5; 5; 5; 5 MG/1; MG/1; MG/1; MG/1
20 CAPSULE, EXTENDED RELEASE ORAL EVERY MORNING
Qty: 30 CAPSULE | Refills: 0 | Status: SHIPPED | OUTPATIENT
Start: 2024-10-24

## 2024-08-29 RX ORDER — DEXTROAMPHETAMINE SACCHARATE, AMPHETAMINE ASPARTATE MONOHYDRATE, DEXTROAMPHETAMINE SULFATE AND AMPHETAMINE SULFATE 5; 5; 5; 5 MG/1; MG/1; MG/1; MG/1
20 CAPSULE, EXTENDED RELEASE ORAL EVERY MORNING
Qty: 30 CAPSULE | Refills: 0 | Status: SHIPPED | OUTPATIENT
Start: 2024-08-29

## 2024-08-29 RX ORDER — DEXTROAMPHETAMINE SACCHARATE, AMPHETAMINE ASPARTATE MONOHYDRATE, DEXTROAMPHETAMINE SULFATE AND AMPHETAMINE SULFATE 5; 5; 5; 5 MG/1; MG/1; MG/1; MG/1
20 CAPSULE, EXTENDED RELEASE ORAL EVERY MORNING
Qty: 30 CAPSULE | Refills: 0 | Status: SHIPPED | OUTPATIENT
Start: 2024-09-26

## 2024-08-29 NOTE — TELEPHONE ENCOUNTER
----- Message from SHEILA Hutchison MD sent at 8/29/2024  4:18 PM CDT -----  Assist with cardiology referral if needed.  VV F/U with me in about 13 weeks.

## 2024-08-29 NOTE — PROGRESS NOTES
TELEMEDICINE VIRTUAL VIDEO VISIT  8/29/24  4:00 PM CDT    Visit Type: Audiovisual    Patient's Location: Alicia represents that they are located within the state [facility].    History of Present Illness    Alicia presents today for review of recent heart test results and discussion of next steps.    Recent heart monitor findings show a fast average heart rate with episodes of elevation, particularly during a day of moving. No atrial fibrillation or dangerous heart rhythms were detected. She marked an episode of palpitations, which corresponded with a premature ventricular contraction (PVC). Currently, she experiences palpitations daily, reduced from previous frequency. Echocardiogram results reveal a mildly dilated left ventricle with reasonably normal but asymmetric wall thickness. EF is normal at 60-65%. She denies any evidence of a hole in the heart or shunt.    She reports experiencing palpitations daily, which she can perceive and correspond to PVCs as noted on her heart monitor. She denies any other cardiac symptoms.    She uses a Achieve X watch for heart rate tracking, which syncs data to her phone or an olivia, allowing her to view her average heart rate and range over specified time periods.    She is currently taking Adderall with approximately a two-week supply remaining. She also takes Wellbutrin, refilling it every four weeks despite originally being ordered for a 90-day supply. She believes the 30-day dispensing may be due to insurance restrictions.    She has a history of ADHD, predominantly inattentive type.    She reports recent moving activity to a new residence.       Review of Systems   Constitutional:  Negative for activity change and unexpected weight change.   HENT:  Negative for hearing loss, rhinorrhea and trouble swallowing.    Eyes:  Negative for discharge and visual disturbance.   Respiratory:  Negative for chest tightness and wheezing.    Cardiovascular:  Negative for chest pain and  palpitations.   Gastrointestinal:  Negative for blood in stool, constipation, diarrhea and vomiting.   Genitourinary:  Negative for difficulty urinating and hematuria.   Musculoskeletal:  Negative for neck pain.   Neurological:  Negative for headaches.   Psychiatric/Behavioral:  Negative for dysphoric mood.        Assessment & Plan     Reviewed heart monitor results: unremarkable, no atrial fibrillation or dangerous rhythms; noted episodes of elevated heart rate   Analyzed echocardiogram findings: left ventricle mildly dilated and asymmetric, but functioning normally (EF 60-65%)   Considered cardiac stress test but deemed not appropriate for suspected cardiomyopathy   Noted premature ventricular contractions corresponding with patient-reported palpitations   Decided against initial plan for cardiology consult, opting for direct cardiology referral for comprehensive evaluation  I51.7 CARDIOMEGALY:   Explained the function of left and right sides of the heart to the patient.   Discussed normal EF range (60% and above) with the patient.   Echocardiogram revealed a mildly dilated left ventricle with normal wall thickness but asymmetric enlargement.   Noted EF is normal at 60% to 65%.   Suspected possible cardiomyopathy and recommended further evaluation by a cardiologist.   Referred the patient to a cardiologist for further evaluation and management.  I49.3 VENTRICULAR PREMATURE DEPOLARIZATION:   The patient reports experiencing palpitations daily.   Heart monitor showed premature ventricular contractions corresponding to patient-reported palpitations.   Acknowledged the patient's sensitivity to heart rhythm changes.   Referred the patient to a cardiologist for overall cardiac evaluation.  F90.0 ATTENTION-DEFICIT HYPERACTIVITY DISORDER, PREDOMINANTLY INATTENTIVE TYPE:   Refilled Adderall for a 90-day supply (3 prescriptions, each for 30 days).   Continued Wellbutrin with no changes.   Instructed the patient to schedule  a follow-up video visit before running out of the 3rd Adderall prescription.   Advised the patient to fill Adderall prescriptions every 28-30 days to ensure validity.  R00.0 TACHYCARDIA, UNSPECIFIED:   Clarified that cardiac stress test is typically used to evaluate coronary artery disease, which is not suspected in this case.   Heart monitor showed fast average heart rate and episodes of elevated heart rate.   Noted inappropriately fast heart rate on multiple occasions.   Requested the patient to check average heart rate and range on personal fitness tracker.   Referred the patient to a cardiologist for further evaluation of tachycardia and other cardiac findings.  R00.2 PALPITATIONS:   The patient reports experiencing palpitations daily.   Heart monitor showed premature ventricular contractions corresponding to patient-reported palpitations.   Acknowledged the patient's sensitivity to heart rhythm changes.   Referred the patient to a cardiologist for further evaluation of palpitations and other cardiac findings.       1. Palpitations  -     Ambulatory referral/consult to Cardiology; Future; Expected date: 09/05/2024    2. Left ventricular dilatation  -     Ambulatory referral/consult to Cardiology; Future; Expected date: 09/05/2024    3. ADHD, predominantly inattentive type  -     dextroamphetamine-amphetamine (ADDERALL XR) 20 MG 24 hr capsule; Take 1 capsule (20 mg total) by mouth every morning.  Dispense: 30 capsule; Refill: 0  -     dextroamphetamine-amphetamine (ADDERALL XR) 20 MG 24 hr capsule; Take 1 capsule (20 mg total) by mouth every morning.  Dispense: 30 capsule; Refill: 0  -     dextroamphetamine-amphetamine (ADDERALL XR) 20 MG 24 hr capsule; Take 1 capsule (20 mg total) by mouth every morning.  Dispense: 30 capsule; Refill: 0    No other significant complaints or concerns were reported.  Today's visit involved the intricate management of episodic problem(s) and the ongoing care for the patient's  serious or complex condition(s) listed above, reflecting the inherent complexity of providing longitudinal, comprehensive evaluation and management as the central hub for the patient's primary care services.  [place] Board of Pharmacy Controlled Prescription Drug Monitoring database was queried and showed no activity to suggest abuse, diversion, or other improper use of prescription medications.   There were no vitals filed for this visit.  PHYSICAL EXAM:  GENERAL APPEARANCE:  - Alert and grossly oriented.  - No apparent distress, breathing comfortably.     EYES:  - Sclera without icterus.     EARS, NOSE, AND THROAT:  - No visible abnormalities.     RESPIRATORY:  - No respiratory distress.  - No audible wheezing or cough.     PSYCHIATRIC:  - Mood and affect appropriate; behavior cooperative.    This note was generated with the assistance of ambient listening technology. Verbal consent was obtained by the patient and accompanying visitor(s) for the recording of patient appointment to facilitate this note. I attest to having reviewed and edited the generated note for accuracy, though some syntax or spelling errors may persist. Please contact the author of this note for any clarification.      I spent a total of 17 minutes today evaluating and managing this patient for this encounter.  This includes face to face time and non-face to face time preparing to see the patient (eg, review of tests), obtaining and/or reviewing separately obtained history, documenting clinical information in the electronic or other health record, independently interpreting results and communicating results to the patient/family/caregiver, or care coordinator. This time was exclusive of any separately billable procedures for this patient and exclusive of time spent treating any other patient.    Documentation entered by me for this encounter may have been done in part using speech-recognition technology. Although I have made an effort to  "ensure accuracy, "sound like" errors may exist and should be interpreted in context.    Each patient to whom medical services are provided by telemedicine is: (1) informed of the relationship between the physician and patient and the respective role of any other health care provider with respect to management of the patient; and (2) notified that he or she may decline to receive medical services by telemedicine and may withdraw from such care at any time.     WRAP-UP INSTRUCTIONS  VV F/U 13W  "

## 2024-09-13 ENCOUNTER — OFFICE VISIT (OUTPATIENT)
Dept: CARDIOLOGY | Facility: CLINIC | Age: 44
End: 2024-09-13
Payer: COMMERCIAL

## 2024-09-13 ENCOUNTER — TELEPHONE (OUTPATIENT)
Dept: SLEEP MEDICINE | Facility: CLINIC | Age: 44
End: 2024-09-13
Payer: COMMERCIAL

## 2024-09-13 VITALS
WEIGHT: 220.88 LBS | SYSTOLIC BLOOD PRESSURE: 102 MMHG | DIASTOLIC BLOOD PRESSURE: 76 MMHG | OXYGEN SATURATION: 98 % | HEIGHT: 69 IN | BODY MASS INDEX: 32.72 KG/M2 | HEART RATE: 90 BPM

## 2024-09-13 DIAGNOSIS — M54.81 BILATERAL OCCIPITAL NEURALGIA: ICD-10-CM

## 2024-09-13 DIAGNOSIS — I49.3 FREQUENT UNIFOCAL PVCS: ICD-10-CM

## 2024-09-13 DIAGNOSIS — I51.7 LEFT VENTRICULAR DILATATION: Chronic | ICD-10-CM

## 2024-09-13 DIAGNOSIS — R00.2 PALPITATIONS: Chronic | ICD-10-CM

## 2024-09-13 DIAGNOSIS — F41.9 RECURRENT MILD MAJOR DEPRESSIVE DISORDER WITH ANXIETY: Chronic | ICD-10-CM

## 2024-09-13 DIAGNOSIS — R51.9 CHRONIC DAILY HEADACHE: ICD-10-CM

## 2024-09-13 DIAGNOSIS — R06.02 SHORTNESS OF BREATH: Primary | ICD-10-CM

## 2024-09-13 DIAGNOSIS — G93.2 IIH (IDIOPATHIC INTRACRANIAL HYPERTENSION): Chronic | ICD-10-CM

## 2024-09-13 DIAGNOSIS — F33.0 RECURRENT MILD MAJOR DEPRESSIVE DISORDER WITH ANXIETY: Chronic | ICD-10-CM

## 2024-09-13 DIAGNOSIS — F90.0 ADHD, PREDOMINANTLY INATTENTIVE TYPE: Chronic | ICD-10-CM

## 2024-09-13 PROCEDURE — 99999 PR PBB SHADOW E&M-EST. PATIENT-LVL IV: CPT | Mod: PBBFAC,,, | Performed by: INTERNAL MEDICINE

## 2024-09-13 NOTE — PROGRESS NOTES
Subjective:   Patient ID:  Alicia Teixeira is a 43 y.o. female who presents for evaluation of Follow-up (Echo abnormal /) and Palpitations (On going for a few months )      HPI  A  44 yo female with chronic headache palpitation shortness of breath obesity is here for evaluation of abnoprmal echo. She has negative bubble study her lv dimensions are normal. Has no significant abnormality ahs gained significant amount of weight over the apst 2-3 years around 50lbs. Has no other issues clinically she is on adhd meds gets palpitation vital connect is benign. Ha sno syncope no chf symptoms except has exertional shortness of breath she used to rid eher bike for exercise  last time was 2 years ago  Past Medical History:   Diagnosis Date    Allergy     Generalized headaches     Herpes zoster without complication 04/11/2022    diagnosed by [facility]    History of concussion 11/16/2017 5/2012, hit head after passing out, +LOC    History of kidney stones 2022    Migraine headache     followed by [facility]    Vitamin D deficiency        Past Surgical History:   Procedure Laterality Date    ARTHROSCOPIC REPAIR OF ROTATOR CUFF OF SHOULDER  10/12/2021    Dr. Chaves    BREAST SURGERY      excess tissue outer breast/axillary area removed - right    INJECTION OF ANESTHETIC AGENT AROUND NERVE Bilateral 04/16/2019    Procedure: BLOCK, NERVE, GREATER AND LESSER OCCIPITAL Need Consent;  Surgeon: Isabelle River MD;  [facility];  Service: Pain Management;  Laterality: Bilateral;    INJECTION OF ANESTHETIC AGENT AROUND NERVE Right 09/11/2021    Procedure: BLOCK, NERVE GREATER AND LESSER OCCIPITAL;  Surgeon: Isabelle River MD;  [facility];  Service: Pain Management;  Laterality: Right;    RADIOFREQUENCY ABLATION Bilateral 04/08/2022    Procedure: B/L Greater and Lesser Occipital Nerve RFA;  Surgeon: Manuel Garner DO;  Location: [facility];  Service: Pain Management;  Laterality:  Bilateral;       Social History     Tobacco Use    Smoking status: Never     Passive exposure: Past    Smokeless tobacco: Never   Substance Use Topics    Alcohol use: Not Currently     Comment: occasional - once a month - two drinks per occasion    Drug use: Never       Family History   Problem Relation Name Age of Onset    Cancer Mother Maribell Elizabeth        breast    Breast cancer Mother Maribell Elizabeth    Hyperlipidemia Mother Maribell     Breast cancer Maternal Aunt      Cancer Maternal Grandfather Alex         lung-     Depression Paternal Grandmother      Diabetes Brother Eliezer     Depression Brother Eliezer         bipolar    Heart disease Brother Elieezr     Ovarian cancer Neg Hx         Current Outpatient Medications   Medication Sig    buPROPion (WELLBUTRIN XL) 300 MG 24 hr tablet Take 1 tablet (300 mg total) by mouth every morning.    dextroamphetamine-amphetamine (ADDERALL XR) 20 MG 24 hr capsule Take 1 capsule (20 mg total) by mouth every morning.    galcanezumab-gnlm (EMGALITY PEN) 120 mg/mL PnIj Inject 120 mg into the skin every 28 days.    rimegepant (NURTEC) 75 mg odt Take 1 tablet (75 mg total) by mouth daily as needed for Migraine (once per 48 hours). Place ODT tablet on the tongue; alternatively the ODT tablet may be placed under the tongue    [START ON 2024] dextroamphetamine-amphetamine (ADDERALL XR) 20 MG 24 hr capsule Take 1 capsule (20 mg total) by mouth every morning.    [START ON 10/24/2024] dextroamphetamine-amphetamine (ADDERALL XR) 20 MG 24 hr capsule Take 1 capsule (20 mg total) by mouth every morning.     No current facility-administered medications for this visit.     Current Outpatient Medications on File Prior to Visit   Medication Sig    buPROPion (WELLBUTRIN XL) 300 MG 24 hr tablet Take 1 tablet (300 mg total) by mouth every morning.    dextroamphetamine-amphetamine (ADDERALL XR) 20 MG 24 hr capsule Take 1 capsule (20 mg total) by mouth every morning.     galcanezumab-gnlm (EMGALITY PEN) 120 mg/mL PnIj Inject 120 mg into the skin every 28 days.    rimegepant (NURTEC) 75 mg odt Take 1 tablet (75 mg total) by mouth daily as needed for Migraine (once per 48 hours). Place ODT tablet on the tongue; alternatively the ODT tablet may be placed under the tongue    [START ON 9/26/2024] dextroamphetamine-amphetamine (ADDERALL XR) 20 MG 24 hr capsule Take 1 capsule (20 mg total) by mouth every morning.    [START ON 10/24/2024] dextroamphetamine-amphetamine (ADDERALL XR) 20 MG 24 hr capsule Take 1 capsule (20 mg total) by mouth every morning.     No current facility-administered medications on file prior to visit.       Review of patient's allergies indicates:   Allergen Reactions    Imitrex [sumatriptan succinate] Nausea Only       Review of Systems   Constitutional: Negative for diaphoresis, malaise/fatigue and weight gain.   HENT:  Negative for hoarse voice.    Eyes:  Negative for double vision and visual disturbance.   Cardiovascular:  Positive for palpitations. Negative for chest pain, claudication, cyanosis, dyspnea on exertion, irregular heartbeat, leg swelling, near-syncope, orthopnea, paroxysmal nocturnal dyspnea and syncope.   Respiratory:  Positive for shortness of breath. Negative for cough, hemoptysis and snoring.    Hematologic/Lymphatic: Negative for bleeding problem. Does not bruise/bleed easily.   Skin:  Negative for color change and poor wound healing.   Musculoskeletal:  Negative for muscle cramps, muscle weakness and myalgias.   Gastrointestinal:  Negative for bloating, abdominal pain, change in bowel habit, diarrhea, heartburn, hematemesis, hematochezia, melena and nausea.   Neurological:  Negative for excessive daytime sleepiness, dizziness, headaches, light-headedness, loss of balance, numbness and weakness.   Psychiatric/Behavioral:  Negative for memory loss. The patient does not have insomnia.    Allergic/Immunologic: Negative for hives.       Objective:    Physical Exam  Vitals and nursing note reviewed.   Constitutional:       General: She is not in acute distress.     Appearance: Normal appearance. She is well-developed. She is obese. She is not ill-appearing.   HENT:      Head: Normocephalic and atraumatic.   Eyes:      General: No scleral icterus.     Pupils: Pupils are equal, round, and reactive to light.   Neck:      Thyroid: No thyromegaly.      Vascular: Normal carotid pulses. No carotid bruit, hepatojugular reflux or JVD.      Trachea: No tracheal deviation.   Cardiovascular:      Rate and Rhythm: Normal rate and regular rhythm.      Pulses: Normal pulses.      Heart sounds: Normal heart sounds. No murmur heard.     No friction rub. No gallop.   Pulmonary:      Effort: Pulmonary effort is normal. No respiratory distress.      Breath sounds: Normal breath sounds. No wheezing, rhonchi or rales.   Chest:      Chest wall: No tenderness.   Abdominal:      General: Bowel sounds are normal. There is no abdominal bruit.      Palpations: Abdomen is soft. There is no hepatomegaly or pulsatile mass.      Tenderness: There is no abdominal tenderness.   Musculoskeletal:      Right shoulder: No deformity.      Cervical back: Normal range of motion and neck supple.      Right lower leg: No edema.      Left lower leg: No edema.   Skin:     General: Skin is warm and dry.      Findings: No erythema or rash.      Nails: There is no clubbing.   Neurological:      General: No focal deficit present.      Mental Status: She is alert and oriented to person, place, and time.      Cranial Nerves: No cranial nerve deficit.      Coordination: Coordination normal.   Psychiatric:         Mood and Affect: Mood normal.         Speech: Speech normal.         Behavior: Behavior normal.       Vitals:    09/13/24 0909 09/13/24 0910   BP: 108/70 102/76   BP Location: Right arm Left arm   Patient Position: Sitting Sitting   BP Method: Large (Manual) Large (Manual)   Pulse: 90    SpO2: 98%   "  Weight: 100.2 kg (220 lb 14.4 oz)    Height: 5' 9" (1.753 m)      Lab Results   Component Value Date    CHOL 206 (H) 06/21/2024    CHOL 186 05/05/2023    CHOL 172 04/21/2021     Body mass index is 32.62 kg/m².   Lab Results   Component Value Date    HGBA1C 5.2 06/21/2024      BMP  Lab Results   Component Value Date     06/21/2024    K 4.4 06/21/2024     06/21/2024    CO2 22 (L) 06/21/2024    BUN 12 06/21/2024    CREATININE 0.9 06/21/2024    CALCIUM 9.2 06/21/2024    ANIONGAP 10 06/21/2024    EGFRNORACEVR >60 06/21/2024      Lab Results   Component Value Date    HDL 58 06/21/2024    HDL 56 05/05/2023    HDL 53 04/21/2021     Lab Results   Component Value Date    LDLCALC 127.0 06/21/2024    LDLCALC 115.4 05/05/2023    LDLCALC 94.8 04/21/2021     Lab Results   Component Value Date    TRIG 105 06/21/2024    TRIG 73 05/05/2023    TRIG 121 04/21/2021     Lab Results   Component Value Date    CHOLHDL 28.2 06/21/2024    CHOLHDL 30.1 05/05/2023    CHOLHDL 30.8 04/21/2021       Chemistry        Component Value Date/Time     06/21/2024 0804    K 4.4 06/21/2024 0804     06/21/2024 0804    CO2 22 (L) 06/21/2024 0804    BUN 12 06/21/2024 0804    CREATININE 0.9 06/21/2024 0804    GLU 89 06/21/2024 0804        Component Value Date/Time    CALCIUM 9.2 06/21/2024 0804    ALKPHOS 103 06/21/2024 0804    AST 12 06/21/2024 0804    ALT 11 06/21/2024 0804    BILITOT 0.4 06/21/2024 0804    ESTGFRAFRICA >60 02/03/2022 1005    EGFRNONAA >60 02/03/2022 1005          Lab Results   Component Value Date    TSH 2.408 06/21/2024     Lab Results   Component Value Date    INR 1.0 12/19/2011    INR 1.0 12/14/2011     Lab Results   Component Value Date    WBC 6.94 06/21/2024    HGB 12.1 06/21/2024    HCT 38.0 06/21/2024    MCV 82 06/21/2024     06/21/2024     BNP  @LABRCNTIP(BNP,BNPTRIAGEBLO)@  CrCl cannot be calculated (Patient's most recent lab result is older than the maximum 7 days allowed.).    Interpretation " Summary  Show Result ComparisonThe patient was monitored for a total of 3d 10h, underlying rhythm is Sinus.  The minimum heart rate was 71 bpm; the maximum 143 bpm; the average 92 bpm.  0 % of Atrial fibrillation/Atrial flutter with longest episode of 0 ms.  The total burden of AV Block present was 0 % [Complete Heart Block: 0 %; Advanced (High Grade):  0 %; 2nd Degree, Mobitz II: 0 %; 2nd Degree, Mobitz I: 0 %].  There were 0 pauses, the longest pause was 0 ms at --.  Total count of Ventricular Tachycardia (VT): 0 episode(s). Longest VT: 0 s on --. Fastest VT: -- bpm on  --.  0 supraventricular episodes were found. Longest SVT Episode 0 s, Fastest SVT -- bpm  There were a total of 7 PVCs with 3 morphologies and 0 couplets. Overall PVC [place] at < 0.01 %  There were a total of 0 Other Beats. There were 0 total number of paced beats.  There were a total of 21 PSVCs with 1 morphologies and 1 couplets. Overall PSVC [place] at  < 0.01 %  There is a total of 2 patient events    Summary  Show Result Comparison     Left Ventricle: The left ventricle is mildly dilated. Normal wall thickness. There is eccentric hypertrophy. There is normal systolic function with a visually estimated ejection fraction of 60 - 65%. Ejection fraction by visual approximation is 60%. There is normal diastolic function.    Right Ventricle: Normal right ventricular cavity size. Wall thickness is normal. Systolic function is normal.    Pulmonary Artery: The estimated pulmonary artery systolic pressure is 30 mmHg.    IVC/SVC: Normal venous pressure at 3 mmHg  Assessment:     1. Shortness of breath    2. Palpitations    3. Left ventricular dilatation    4. IIH (idiopathic intracranial hypertension)    5. Recurrent mild major depressive disorder with anxiety    6. Chronic daily headache    7. Bilateral occipital neuralgia    8. ADHD, predominantly inattentive type    9. Frequent unifocal PVCs    10. BMI 32.0-32.9,adult      Ocverall the patient has  gained weight and has new onset shortness of breath. Her EKG is normal she will benefit from stress evaluation and  weight loss. Her echo does not recveal pul;monary htn or shunting but sleep apnea needs to be ruled out will get sleep study done. Her weight gain may be related to ehr emgality will get her on good appropriate diet back exercise so she can lose the weight and hopefully ehr symptoms will improve in addition top her lipids.    I have reviewed the cho myself the lv is normal in size her vital connect is benign her palpitation vinayak be related to adhd meds she does not drink any etoh or caffeine.   Plan:   Weight loss   Exercuise    Ett   Sleep eval.  Phone review

## 2024-09-20 ENCOUNTER — HOSPITAL ENCOUNTER (OUTPATIENT)
Dept: CARDIOLOGY | Facility: HOSPITAL | Age: 44
Discharge: HOME | End: 2024-09-20
Attending: INTERNAL MEDICINE
Payer: COMMERCIAL

## 2024-09-20 DIAGNOSIS — R06.02 SHORTNESS OF BREATH: ICD-10-CM

## 2024-09-20 LAB
CV STRESS BASE HR: 117 BPM
DIASTOLIC BLOOD PRESSURE: 81 MMHG
OHS CV CPX 85 PERCENT MAX PREDICTED HEART RATE MALE: 149
OHS CV CPX ESTIMATED METS: 10
OHS CV CPX MAX PREDICTED HEART RATE: 175
OHS CV CPX PATIENT IS FEMALE: 1
OHS CV CPX PATIENT IS MALE: 0
OHS CV CPX PEAK DIASTOLIC BLOOD PRESSURE: 87 MMHG
OHS CV CPX PEAK HEAR RATE: 181 BPM
OHS CV CPX PEAK RATE PRESSURE PRODUCT: NORMAL
OHS CV CPX PEAK SYSTOLIC BLOOD PRESSURE: 178 MMHG
OHS CV CPX PERCENT MAX PREDICTED HEART RATE ACHIEVED: 109
OHS CV CPX RATE PRESSURE PRODUCT PRESENTING: NORMAL
STRESS ECHO POST EXERCISE DUR MIN: 8 MINUTES
STRESS ECHO POST EXERCISE DUR SEC: 50 SECONDS
SYSTOLIC BLOOD PRESSURE: 101 MMHG

## 2024-09-20 PROCEDURE — 93018 CV STRESS TEST I&R ONLY: CPT | Mod: ,,, | Performed by: STUDENT IN AN ORGANIZED HEALTH CARE EDUCATION/TRAINING PROGRAM

## 2024-09-20 PROCEDURE — 93016 CV STRESS TEST SUPVJ ONLY: CPT | Mod: ,,, | Performed by: STUDENT IN AN ORGANIZED HEALTH CARE EDUCATION/TRAINING PROGRAM

## 2024-09-20 PROCEDURE — 93017 CV STRESS TEST TRACING ONLY: CPT

## 2024-09-24 ENCOUNTER — TELEPHONE (OUTPATIENT)
Dept: CARDIOLOGY | Facility: CLINIC | Age: 44
End: 2024-09-24
Payer: COMMERCIAL

## 2024-09-24 NOTE — TELEPHONE ENCOUNTER
Called [phone] and spoke with patient.  I advised results and she voiced understanding.       ----- Message from Dawson Larry MD sent at 9/23/2024  6:59 PM CDT -----  Stress test is ok   Probably her adhd meds are affecting symptoms.

## 2024-10-22 ENCOUNTER — PROCEDURE VISIT (OUTPATIENT)
Dept: NEUROLOGY | Facility: CLINIC | Age: 44
End: 2024-10-22
Payer: COMMERCIAL

## 2024-10-22 VITALS
SYSTOLIC BLOOD PRESSURE: 113 MMHG | WEIGHT: 220.88 LBS | HEIGHT: 69 IN | DIASTOLIC BLOOD PRESSURE: 80 MMHG | BODY MASS INDEX: 32.72 KG/M2 | HEART RATE: 91 BPM

## 2024-10-22 DIAGNOSIS — R63.5 WEIGHT GAIN: ICD-10-CM

## 2024-10-22 DIAGNOSIS — G43.709 CHRONIC MIGRAINE WITHOUT AURA WITHOUT STATUS MIGRAINOSUS, NOT INTRACTABLE: Primary | ICD-10-CM

## 2024-10-22 PROCEDURE — 64615 CHEMODENERV MUSC MIGRAINE: CPT | Mod: S$GLB,,,

## 2024-10-22 NOTE — PROCEDURES
SUBJECTIVE:  Patient ID: Alicia Teixeira  Chief Complaint: Botulinum Toxin Injection      History of Present Illness:  Alicia Teixeira is a 44 y.o. female presents to clinic alone for follow-up of headaches and Botox injections.       10/22/2024- Interval History: botox  Pt reports ~60lb weight gain since starting emgality 5 years ago and caridiologist told her that emgality is known to cause weight gain. Interested in switching to Ajovy.       Recommendations made at last Office Visit on 8/2/24:  - continue emgality, botox and nurtec as rescue.     History of Present Illness:   43 y.o. female with chronic migraines, BON, IIH, cervical dystonia and radiculopathy, palpitations/PVCs, adhd, depression, kidney stones, DDD, osteoarthritis, who presents to clinic alone for evaluation of headaches.      Patient is a previous patient of Dr. Chicas, transferring care to me for treatment of chronic migraines. Current regimen includes: Emgality, Botox (last 5/6/24), nurtec     Reports that botox + Emgality has reduced her headache burden by more than 50%, from 30/30 days to 5/30 days and intensity from 10/10 to 5/10.      Patient is also established with pain management, s/p RFA in 5/2019. She then had Iovera with Dr. Chicas on 5/30/24.      PMHx negative for TBI, Meningitis, Aneurysms, Kidney Stones, asthma, GI bleed, osteoporosis, CAD/MI, CVA/TIA, DM, cancer, pregnancy         Headache History:  Onset - child  Previous Hx of HA -   Location/Radiation - L periorbital, bilateral occipitalis and radiates forward with ON  Quality - throbbing, stabbing, aching   Duration - 1 day - 2 weeks   Intensity (range) - 5-9/10 ( higher when botox wearing off)  Frequency - 2/30 ha days per month, 1/30 are debilitating  Triggers - ovulation/menstruation, seafood  Aggravating Factors - light, sound  Alleviating Factors - lying down, dark cold room, ice  Recent Changes - improving  Prodrome/Aura - yes, vertical split/shift in vision and black  "spots  HA today? - no  Time of day of most headaches- anytime, can wake up with headache   Sleep - no snoring, wakes feeling refreshed, resolution of headache with sleep     Associated symptoms with the headache:   Meningeal symptoms - photophobia, phonophobia, exercise intolerance   Nausea/vomitting  Nasal drainage   Visual blurriness   Pallor/flushing  Dizziness   Vertigo  Confusion  Impaired concentration   Pain worsened with physical activity   Neck pain         Treatments Tried   Anti-inflammatories   Maxalt   Imitrex   Axert   Anti-seizure medications  Anti-depressants  Anti-HTN  Aimovig   Emgality* - still taking   Sprix   Nurtec*   **no topamax s/t kidney stones    Current Medications:    Current Outpatient Medications:     buPROPion (WELLBUTRIN XL) 300 MG 24 hr tablet, Take 1 tablet (300 mg total) by mouth every morning., Disp: 90 tablet, Rfl: 3    dextroamphetamine-amphetamine (ADDERALL XR) 20 MG 24 hr capsule, Take 1 capsule (20 mg total) by mouth every morning., Disp: 30 capsule, Rfl: 0    dextroamphetamine-amphetamine (ADDERALL XR) 20 MG 24 hr capsule, Take 1 capsule (20 mg total) by mouth every morning., Disp: 30 capsule, Rfl: 0    [START ON 10/24/2024] dextroamphetamine-amphetamine (ADDERALL XR) 20 MG 24 hr capsule, Take 1 capsule (20 mg total) by mouth every morning., Disp: 30 capsule, Rfl: 0    rimegepant (NURTEC) 75 mg odt, Take 1 tablet (75 mg total) by mouth daily as needed for Migraine (once per 48 hours). Place ODT tablet on the tongue; alternatively the ODT tablet may be placed under the tongue, Disp: 8 tablet, Rfl: 12    fremanezumab-vfrm (AJOVY AUTOINJECTOR) 225 mg/1.5 mL autoinjector, Inject 1.5 mLs (225 mg total) into the skin every 28 days., Disp: 1 each, Rfl: 5    Review of Systems - as per HPI, otherwise a balanced 10 systems review is negative.    OBJECTIVE:  Vitals:  /80   Pulse 91   Ht 5' 9" (1.753 m)   Wt 100.2 kg (220 lb 14.4 oz)   BMI 32.62 kg/m²     Physical " Exam:  Constitutional: she appears well-developed and well-nourished. she is well groomed. NAD   HENT:    Head: Normocephalic and atraumatic  Eyes: Conjunctivae and EOM are normal  Musculoskeletal: Normal range of motion. No joint stiffness.   Skin: Skin is warm and dry.  Psychiatric: Mood and affect are normal    Neuro: Patient is alert and oriented to person, place, and time. Language is intact and fluent.  Recent and remote memory are intact.  Normal attention and concentration.  Facial movement is symmetric.  Gait is normal.     Review of Data:   Notes from cardiology reviewed.   Labs:  Hospital Outpatient Visit on 09/20/2024   Component Date Value Ref Range Status    85% Max Predicted HR 09/20/2024 149   Final    Max Predicted HR 09/20/2024 175   Final    [facility] CV CPX PATIENT IS MALE 09/20/2024 0.0   Final    [facility] CV CPX PATIENT IS FEMALE 09/20/2024 1.0   Final    HR at rest 09/20/2024 117  bpm Final    Systolic blood pressure 09/20/2024 101  mmHg Final    Diastolic blood pressure 09/20/2024 81  mmHg Final    RPP 09/20/2024 11,817   Final    Exercise duration (min) 09/20/2024 8  minutes Final    Peak HR 09/20/2024 181  bpm Final    Peak Systolic BP 09/20/2024 178  mmHg Final    Peak Diatolic BP 09/20/2024 87  mmHg Final    Peak RPP 09/20/2024 32,218   Final    Estimated METs 09/20/2024 10   Final    % Max HR Achieved 09/20/2024 109   Final    Exercise duration (sec) 09/20/2024 50  seconds Final   Procedure visit on 09/06/2024   Component Date Value Ref Range Status    . 09/06/2024 Comment   Final    DIAGNOSIS 09/06/2024 Comment   Final    . 09/06/2024 Comment   Final    . 09/06/2024 Comment   Final    . 09/06/2024 Comment   Final    . 09/06/2024 Comment   Final    . 09/06/2024 Comment   Final    POC Preg Test, Ur 09/17/2024 Negative  Negative Final     Acceptable 09/17/2024 Yes   Final   Hospital Outpatient Visit on 08/19/2024   Component Date Value Ref Range Status    BSA 08/19/2024 2.25  m2 Final     LVOT stroke volume 08/19/2024 51.62  cm3 Final    LVIDd 08/19/2024 5.33  3.5 - 6.0 cm Final    LV Systolic Volume 08/19/2024 55.54  mL Final    LV Systolic Volume Index 08/19/2024 25.4  mL/m2 Final    LVIDs 08/19/2024 3.63  2.1 - 4.0 cm Final    LV ESV A2C 08/19/2024 54.39  mL Final    LV Diastolic Volume 08/19/2024 136.89  mL Final    LV ESV A4C 08/19/2024 62.03  mL Final    LV Diastolic Volume Index 08/19/2024 62.51  mL/m2 Final    Left Ventricular End Systolic Volu* 08/19/2024 55.54  mL Final    Left Ventricular End Diastolic Vol* 08/19/2024 136.89  mL Final    IVS 08/19/2024 1.10  0.6 - 1.1 cm Final    LVOT diameter 08/19/2024 2.04  cm Final    LVOT area 08/19/2024 3.3  cm2 Final    FS 08/19/2024 32  28 - 44 % Final    Left Ventricle Relative Wall Thick* 08/19/2024 0.36  cm Final    PW 08/19/2024 0.97  0.6 - 1.1 cm Final    LV mass 08/19/2024 211.77  g Final    LV Mass Index 08/19/2024 97  g/m2 Final    MV Peak E Theo 08/19/2024 0.60  m/s Final    TDI LATERAL 08/19/2024 0.07  m/s Final    TDI SEPTAL 08/19/2024 0.10  m/s Final    E/E' ratio 08/19/2024 7.06  m/s Final    MV Peak A Theo 08/19/2024 0.62  m/s Final    TR Max Theo 08/19/2024 2.58  m/s Final    E/A ratio 08/19/2024 0.97   Final    IVRT 08/19/2024 79.92  msec Final    LV SEPTAL E/E' RATIO 08/19/2024 6.00  m/s Final    LV LATERAL E/E' RATIO 08/19/2024 8.57  m/s Final    PV Peak S Theo 08/19/2024 0.58  m/s Final    PV Peak D Theo 08/19/2024 0.40  m/s Final    Pulm vein S/D ratio 08/19/2024 1.45   Final    LVOT peak theo 08/19/2024 0.91  m/s Final    Left Ventricular Outflow Tract Deepthi* 08/19/2024 0.65  cm/s Final    Left Ventricular Outflow Tract Deepthi* 08/19/2024 1.87  mmHg Final    RVOT peak VTI 08/19/2024 14.8  cm Final    TAPSE 08/19/2024 2.74  cm Final    RV/LV Ratio 08/19/2024 0.46  cm Final    LA size 08/19/2024 3.25  cm Final    Left Atrium Minor Axis 08/19/2024 4.47  cm Final    Left Atrium Major Axis 08/19/2024 5.16  cm Final    LA Vol (MOD)  08/19/2024 61.71  cm3 Final    TIERRA (MOD) 08/19/2024 28.2  mL/m2 Final    RA Major Axis 08/19/2024 4.35  cm Final    RA Width 08/19/2024 2.94  cm Final    AV regurgitation pressure 1/2 time 08/19/2024 722.2806717542618  ms Final    AR Max Brandon 08/19/2024 2.48  m/s Final    AV mean gradient 08/19/2024 3  mmHg Final    AV peak gradient 08/19/2024 5  mmHg Final    Ao peak brandon 08/19/2024 1.17  m/s Final    Ao VTI 08/19/2024 23.10  cm Final    LVOT peak VTI 08/19/2024 15.80  cm Final    AV valve area 08/19/2024 2.23  cm² Final    AV Velocity Ratio 08/19/2024 0.78   Final    AV index (prosthetic) 08/19/2024 0.68   Final    OVIDIO by Velocity Ratio 08/19/2024 2.54  cm² Final    Triscuspid Valve Regurgitation Pea* 08/19/2024 27  mmHg Final    PV mean gradient 08/19/2024 1  mmHg Final    PV PEAK VELOCITY 08/19/2024 0.84  m/s Final    PV peak gradient 08/19/2024 3  mmHg Final    Pulmonary Valve Mean Velocity 08/19/2024 0.68  m/s Final    RVOT peak brandon 08/19/2024 0.72  m/s Final    Ao root annulus 08/19/2024 3.16  cm Final    Sinus 08/19/2024 3.07  cm Final    STJ 08/19/2024 3.02  cm Final    Mean e' 08/19/2024 0.09  m/s Final    ZLVIDS 08/19/2024 -1.69   Final    ZLVIDD 08/19/2024 -3.28   Final    LA area A4C 08/19/2024 21.72  cm2 Final    LA area A2C 08/19/2024 18.86  cm2 Final    RVDD 08/19/2024 2.43  cm Final    TIERRA 08/19/2024 21.8  mL/m2 Final    LA Vol 08/19/2024 47.64  cm3 Final    LA WIDTH 08/19/2024 3.6  cm Final    EF 08/19/2024 60  % Final    TV resting pulmonary artery pressu* 08/19/2024 30  mmHg Final    RV TB RVSP 08/19/2024 6  mmHg Final    Est. RA pres 08/19/2024 3  mmHg Final   Hospital Outpatient Visit on 08/19/2024   Component Date Value Ref Range Status    Sinus min HR 08/19/2024 71   Final    Sinus max hr 08/19/2024 143   Final    Sinus avg hr 08/19/2024 92   Final     Imaging:  Results for orders placed or performed during the hospital encounter of 12/12/19   MRI Brain Without Contrast    Narrative     EXAMINATION:  MRI BRAIN WITHOUT CONTRAST    CLINICAL HISTORY:  Confusion/delirium, altered LOC, unexplained;.  Disorientation, unspecified    TECHNIQUE:  Multiplanar multisequence MR imaging of the brain was performed without contrast.    COMPARISON:  CT 12/16/2011; MRI 12/15/2011    FINDINGS:  Intracranial compartment:    Ventricles are stable in size without evidence of hydrocephalus. No extra-axial blood or fluid collections.    The brain parenchyma appears normal. No mass lesion, acute hemorrhage, edema or acute infarct.    Normal vascular flow voids are preserved.    Skull/extracranial contents (limited evaluation): Bone marrow signal intensity is normal.      Impression    No intracranial abnormality identified.    Electronically signed by resident: Andrea Abdullahi  Date:    12/12/2019  Time:    14:41    Electronically signed by: Jaquan Kwok MD  Date:    12/12/2019  Time:    14:57   Results for orders placed or performed during the hospital encounter of 12/16/11   MRI Brain W WO Contrast    Narrative    DATE OF EXAM: Dec 15 2011      MRI   0007  -  MRI BRAIN W AND WO CONTRAST:   \  [id number]     CLINICAL HISTORY:   \HA S/P LP     PROCEDURE COMMENT:   \     ICD 9 CODE(S):   (\)     CPT 4 CODE(S)/MODIFIER(S):   (\)     Procedure: MRI the brain with andwithout contrast.     Technique: Sagittal and axial T1, axial T2, axial FLAIR, axial gradient,   axial diffusion, and axial, sagittal, and coronal postcontrast T1 images   of the whole brain.  16 cc of Omni scan gadolinium contrast was   administered.           Comparison: None     Findings: The brain parenchyma is normal in signal and contour. There are   no abnormal areas of parenchymal signal or enhancement. There are no   areas are restricted diffusion to suggest acute infarction. The   ventricles are normal in size and configuration without evidence for   hydrocephalus. There are several regions of the gradient susceptibility   within the ventricular system  and cisterns.  This is suggestive of   pneumocephalus in this patient who is status post recent lumbar puncture.   No parenchymal gradient susceptibility is seen to suggest acute   hemorrhage.  The major intracranial T2  flow-voids are present.        Impression: Mild scattered pneumocephalus, noting patient had recent LP   procedure.         A preliminary report was given to ZAYNAB HORTON MD on 12/15/2011 at   8:13 PM by Delvin Pendleton.  ______________________________________      Electronically signed by resident: Lázaro Tapia MD  Date:     12/16/11  Time:    08:34         ______________________________________      Electronically signed by: Robert Shha MD  Date:     12/16/11  Time:    14:47            : DELON  Transcribe Date/Time: Dec 16 2011  2:48P  Dictated by : LÁZARO TAPIA MD  Read On: Dec 16 2011  8:35A  \  Images were reviewed, findings were verified and document was   electronically  SIGNED BY: ROBERT SHAH MD On: Dec 16 2011  2:48P   LÁZARO TAPIA MD   CT Head Without Contrast    Narrative    DATE OF EXAM: Dec 16 2011      CT    0027  -  HEAD WITHOUT CONTRAST:   \  [id number]     CLINICAL HISTORY:   \HEADACHE     PROCEDURE COMMENT:   \     ICD 9 CODE(S):   (\)     CPT 4 CODE(S)/MODIFIER(S):   (\)     Comparison: MRI from 12/15/2011.     Findings: 5-mm axial noncontrast CT images were obtained of the brain.    Runner and sagittal reformats was process.     The brain demonstrates normal architecture.  There is evidence of   pneumocephalus in the ventricular system and basal cisterns.  This likely   relates to the patient's recent history of lumbar puncture and   corresponds to the finding seen on MRI.  Otherwise no parenchymal mass,   hemorrhage, abnormal calcifications are seen.  No extra-axial masses,   fluid collections or hemorrhage is seen.  The calvarium is intact.  The   ventricular system is normal in size.  Sinuses are clear.        Impression: Mild scattered pneumocephalus,  likely from recent lumbar   puncture.  ______________________________________      Electronically signed by resident: Lázaro Tapia MD  Date:     12/16/11  Time:    11:09         ______________________________________      Electronically signed by: Robert Shah MD  Date:     12/16/11  Time:    14:48            : DELON  Transcribe Date/Time: Dec 16 2011  2:48P  Dictated by : LÁZARO TAPIA MD  Read On: Dec 16 2011 11:09A  \  Images were reviewed, findings were verified and document was   electronically  SIGNED BY: ROBERT SHAH MD On: Dec 16 2011  2:48P   LÁZARO TAPIA MD       Note: I have independently reviewed any/all imaging/labs/tests and agree with the report (s) as documented.  Any discrepancies will be as noted/demarcated by free text.  ROXANN, FNP-C 10/23/2024    ASSESSMENT:  1. Chronic migraine without aura without status migrainosus, not intractable    2. Weight gain        PLAN:  - Switch Emgality to Ajovy. Discussed that weight gain is not a known side effect of emgality and it is very rarely reported in practice, but ok to try alternative injectable if patient wishes. With the handful of patients that have suspected weight gain from emgality in this practice, they do not typically see weight loss when medication is discontinued. Discussed with patient that her migraines have been difficult to control in the past and she has good control with Emgality now. She may see an increase in headaches/migraines by switching injectables but she is willing to take that risk. Ajovy is medically necessary as patient has tried and failed several other medications (see full list above).   - Discussed proper administration and potential side effects of Ajovy.  - Botox administered in clinic for Chronic Migraine (see below)   - RTC in 12 weeks for repeat Botox injections or sooner if needed     Orders Placed This Encounter    onabotulinumtoxina injection 200 Units    fremanezumab-vfrm (AJOVY AUTOINJECTOR)  225 mg/1.5 mL autoinjector       All questions and concerns addressed.  Patient verbalizes understanding and is agreeable with the above stated treatment plan.        PROCEDURE NOTE:  BOTOX was performed as an indicated therapy for intractable chronic migraine headaches given that the patient failed more than 2 headache medications. Patient has continued to achieve a greater than 50% reduction in the frequency of their migraines with continued Botox injections.  Wishes to proceed with today's injections as scheduled.      A time out was conducted just before the start of the procedure to verify the correct patient and procedure, procedure location, and all relevant critical information.      Botulinum Toxin Injection Procedure      PROCEDURE PERFORMED: Botulinum toxin injection (26925)  CLINICAL INDICATION: Chronic Migraines  After risks and benefits were explained including bleeding, infection, worsening of pain, damage to the areas being injected, weakness of muscles, loss of muscle control, dysphagia if injecting the head or neck, facial droop if injecting the facial area, painful injection, allergic or other reaction to the medications being injected, and the failure of the procedure to help the problem, a signed consent was obtained.   The patient was placed in a comfortable area and the sites to be treated were identified.The area to be treated was prepped three times with alcohol and the alcohol allowed to dry. Next, a 30 gauge needle was used to inject the medication in the area to be treated.      Total Botox used: 155 Units   Unavoidable waste: 45 Units      Injection sites:    muscle bilaterally ( a total of 10 units divided into 2 sites)   Procerus muscle (5 units)   Frontalis muscle bilaterally (a total of 20 units divided into 4 sites)   Temporalis muscle bilaterally (a total of 40 units divided into 8 sites)   Occipitalis muscle bilaterally (a total of 30 units divided into 6 sites)    Cervical paraspinal muscles (a total of 20 units divided into 4 sites)   Trapezius muscle bilaterally (a total of 30 units divided into 6 sites)     Complications: none   RTC for the next Botox injection: 12 weeks     Problem List Items Addressed This Visit          Neuro    Chronic migraine without aura without status migrainosus, not intractable - Primary    Overview     Best headache control with Emgality and Botox. This is Medically Necessary as she has partial response to each agent individually. Has been more than 50% beneficial previously for reduction of headaches, will ask for prior auth    She has tried and failed anti seizure medications, anti-inflammatories, triptans (Maxalt, Imitrex, Axert), antidepressants, antihypertensives and still having high burden of headaches.   Aimovig failed, Emgality continue.     Axert,Sprix as rescue  Nurtec is medically necessary  Emgality + Botox for prevention is medically necessary, with excellent control of headaches at 2 days per month, during ovulation and menstruation    botox on 10/18/23, 2/7/24, 5/6/24           Relevant Medications    onabotulinumtoxina injection 200 Units (Start on 10/22/2024  1:30 PM)            ARNOLD Cortez  [facility]   [phone]

## 2024-10-23 RX ORDER — FREMANEZUMAB-VFRM 225 MG/1.5ML
225 INJECTION SUBCUTANEOUS
Qty: 1 EACH | Refills: 5 | Status: SHIPPED | OUTPATIENT
Start: 2024-10-23

## 2024-12-03 ENCOUNTER — OFFICE VISIT (OUTPATIENT)
Dept: INTERNAL MEDICINE | Facility: CLINIC | Age: 44
End: 2024-12-03
Payer: COMMERCIAL

## 2024-12-03 DIAGNOSIS — F90.0 ADHD, PREDOMINANTLY INATTENTIVE TYPE: Chronic | ICD-10-CM

## 2024-12-03 DIAGNOSIS — Z12.12 SCREENING FOR COLORECTAL CANCER: ICD-10-CM

## 2024-12-03 DIAGNOSIS — Z12.11 SCREENING FOR COLORECTAL CANCER: ICD-10-CM

## 2024-12-03 DIAGNOSIS — F33.0 RECURRENT MILD MAJOR DEPRESSIVE DISORDER WITH ANXIETY: Primary | Chronic | ICD-10-CM

## 2024-12-03 DIAGNOSIS — F41.9 RECURRENT MILD MAJOR DEPRESSIVE DISORDER WITH ANXIETY: Primary | Chronic | ICD-10-CM

## 2024-12-03 PROCEDURE — 99214 OFFICE O/P EST MOD 30 MIN: CPT | Mod: 95,,, | Performed by: FAMILY MEDICINE

## 2024-12-03 PROCEDURE — G2211 COMPLEX E/M VISIT ADD ON: HCPCS | Mod: 95,,, | Performed by: FAMILY MEDICINE

## 2024-12-03 RX ORDER — DEXTROAMPHETAMINE SACCHARATE, AMPHETAMINE ASPARTATE MONOHYDRATE, DEXTROAMPHETAMINE SULFATE AND AMPHETAMINE SULFATE 5; 5; 5; 5 MG/1; MG/1; MG/1; MG/1
20 CAPSULE, EXTENDED RELEASE ORAL EVERY MORNING
Qty: 30 CAPSULE | Refills: 0 | Status: SHIPPED | OUTPATIENT
Start: 2024-12-03

## 2024-12-03 RX ORDER — DEXTROAMPHETAMINE SACCHARATE, AMPHETAMINE ASPARTATE MONOHYDRATE, DEXTROAMPHETAMINE SULFATE AND AMPHETAMINE SULFATE 5; 5; 5; 5 MG/1; MG/1; MG/1; MG/1
20 CAPSULE, EXTENDED RELEASE ORAL EVERY MORNING
Qty: 30 CAPSULE | Refills: 0 | Status: SHIPPED | OUTPATIENT
Start: 2024-12-31

## 2024-12-03 RX ORDER — BUPROPION HYDROCHLORIDE 300 MG/1
300 TABLET ORAL EVERY MORNING
Qty: 90 TABLET | Refills: 3 | Status: SHIPPED | OUTPATIENT
Start: 2024-12-03

## 2024-12-03 RX ORDER — DEXTROAMPHETAMINE SACCHARATE, AMPHETAMINE ASPARTATE MONOHYDRATE, DEXTROAMPHETAMINE SULFATE AND AMPHETAMINE SULFATE 5; 5; 5; 5 MG/1; MG/1; MG/1; MG/1
20 CAPSULE, EXTENDED RELEASE ORAL EVERY MORNING
Qty: 30 CAPSULE | Refills: 0 | Status: SHIPPED | OUTPATIENT
Start: 2025-01-28

## 2024-12-03 NOTE — PROGRESS NOTES
TELEMEDICINE VIRTUAL VIDEO VISIT  12/3/24 11:40 AM CST    Visit Type: Audiovisual    Patient's Location: Alicia represents that they are located within the [facility].    CHIEF COMPLAINT: Follow-up    She reports that the bupropion 300 milligrams is working well for her depression and anxiety, which she assesses as well-controlled and stable. She is tolerating the bupropion without adverse side effects and wants to continue.    She says the Adderall XR 20 milligrams is working well for her ADHD, and she is tolerating the medication well without adverse side effects. She says she is no longer having the palpitations she previously mentioned. We discussed her recent cardiac test results. She has a sleep test scheduled to evaluate her for obstructive sleep apnea. She typically skips her Adderall doses on the weekend. She wants to continue Adderall XR as currently prescribed, assessing that it is adequately addressing her ADHD symptoms.    We discussed that she is age 44 and due for colorectal cancer screening. She reports no family history of colorectal cancer. She opted for Cologuard.      1. Screening for colorectal cancer  -     Cologuard Screening (Multitarget Stool DNA); Future; Expected date: 12/03/2024    2. Recurrent mild major depressive disorder with anxiety  -     buPROPion (WELLBUTRIN XL) 300 MG 24 hr tablet; Take 1 tablet (300 mg total) by mouth every morning.  Dispense: 90 tablet; Refill: 3    3. ADHD, predominantly inattentive type  -     dextroamphetamine-amphetamine (ADDERALL XR) 20 MG 24 hr capsule; Take 1 capsule (20 mg total) by mouth every morning.  Dispense: 30 capsule; Refill: 0  -     dextroamphetamine-amphetamine (ADDERALL XR) 20 MG 24 hr capsule; Take 1 capsule (20 mg total) by mouth every morning.  Dispense: 30 capsule; Refill: 0  -     dextroamphetamine-amphetamine (ADDERALL XR) 20 MG 24 hr capsule; Take 1 capsule (20 mg total) by mouth every morning.  Dispense: 30 capsule; Refill:  0    No other significant complaints or concerns were reported.  Today's visit involved the intricate management of episodic problem(s) and the ongoing care for the patient's serious or complex condition(s) listed above, reflecting the inherent complexity of providing longitudinal, comprehensive evaluation and management as the central hub for the patient's primary care services.  [place] Board of Pharmacy Controlled Prescription Drug Monitoring database was queried and showed no activity to suggest abuse, diversion, or other improper use of prescription medications.   There were no vitals filed for this visit.  PHYSICAL EXAM:  GENERAL APPEARANCE:  - Alert and grossly oriented.  - No apparent distress, breathing comfortably.     EYES:  - Sclera without icterus.     EARS, NOSE, AND THROAT:  - No visible abnormalities.     RESPIRATORY:  - No respiratory distress.  - No audible wheezing or cough.     PSYCHIATRIC:  - Mood and affect appropriate; behavior cooperative.  Review of Systems   Constitutional:  Negative for activity change.   HENT:  Negative for hearing loss and trouble swallowing.    Eyes:  Negative for discharge.   Respiratory:  Negative for chest tightness and wheezing.    Cardiovascular:  Negative for chest pain and palpitations.   Gastrointestinal:  Negative for constipation, diarrhea and vomiting.   Genitourinary:  Negative for difficulty urinating and hematuria.   Neurological:  Negative for headaches.   Psychiatric/Behavioral:  Negative for dysphoric mood.      Follow up in about 3 months (around 3/3/2025) for virtual visit, w/ me, re-evaluation.    I spent a total of 10 minutes today evaluating and managing this patient for this encounter.  This includes face to face time and non-face to face time preparing to see the patient (eg, review of tests), obtaining and/or reviewing separately obtained history, documenting clinical information in the electronic or other health record, independently  "interpreting results and communicating results to the patient/family/caregiver, or care coordinator.  This time was exclusive of any separately billable procedures for this patient and exclusive of time spent treating any other patient.    Documentation entered by me for this encounter may have been done in part using speech-recognition technology. Although I have made an effort to ensure accuracy, "sound like" errors may exist and should be interpreted in context.  Each patient to whom medical services are provided by telemedicine is: (1) informed of the relationship between the physician and patient and the respective role of any other health care provider with respect to management of the patient; and (2) notified that he or she may decline to receive medical services by telemedicine and may withdraw from such care at any time.  "

## 2024-12-06 ENCOUNTER — HOSPITAL ENCOUNTER (OUTPATIENT)
Dept: SLEEP MEDICINE | Facility: HOSPITAL | Age: 44
Discharge: HOME | End: 2024-12-06
Attending: INTERNAL MEDICINE
Payer: COMMERCIAL

## 2024-12-06 DIAGNOSIS — R06.02 SHORTNESS OF BREATH: ICD-10-CM

## 2024-12-06 PROCEDURE — 95806 SLEEP STUDY UNATT&RESP EFFT: CPT | Performed by: INTERNAL MEDICINE

## 2024-12-09 PROCEDURE — 95800 SLP STDY UNATTENDED: CPT | Mod: 26,,, | Performed by: INTERNAL MEDICINE

## 2024-12-09 NOTE — PROCEDURES
"PHYSICIAN INTERPRETATION AND COMMENTS: Clinically significant sleep disordered breathing is not identified. Clinically  significant sleep disordered breathing is not identified. Please refer to sleep disorders clinic  CLINICAL HISTORY: 44 year old female presented with: 14 inch neck, BMI of 29.5, an [place] sleepiness score of 7, and no  co-morbidities. Based on the clinical history, the patient has no apparent risk of having SHEYLA.  SLEEP STUDY FINDINGS: Patient underwent a 1 night Home Sleep Test and by behavioral criteria, slept for approximately  5.98 hours, with a sleep latency of 6 minutes and a sleep efficiency of 96%. Snoring occurs for 3.5% (30 dB) of the study.  The mean pulse rate is 85.5 BPM, with infrequent pulse rate variability (31 events with >= 6 BPM increase/decrease per  hour).  TREATMENT CONSIDERATIONS: Based on this study, treatment is not required at this time for obstructive sleep  apnea/hypopnea. Weight gain, alcohol consumption, and time spent sleeping supine can affect the severity of SHEYLA. Future  testing should be considered as the patient's risk factors change.  DISEASE MANAGEMENT CONSIDERATIONS: None.    Dear Dawson Larry MD  [address] [place] [address]/SHEILA Hutchison MD         The sleep study that you ordered is complete.  You have ordered sleep LAB services to perform the sleep study for Alicia Teixeira .      Please find Sleep Study result in  the "Media tab" of Chart Review menu.        You can look  for the report in the  Media by the document type "Sleep Study Documents". Alphabetizing  "Document type" column helps to find the SLEEP STUDY report  Faster.       As the ordering provider, you are responsible for reviewing the results and implementing a treatment plan with your patient.    If you need a Sleep Medicine provider to explain the sleep study findings and arrange treatment for the patient, please refer patient for consultation to our Sleep " "Clinic via [facility] with Ambulatory Consult Sleep.     To do that please place an order for an  "Ambulatory Consult Sleep" -  order , it will go to our clinic work queue for our staff  to contact the patient for an appointment.      For any questions, please contact our sleep lab  staff at [phone] to talk to clinical staff          Eric Shen MD    "

## 2024-12-16 ENCOUNTER — OFFICE VISIT (OUTPATIENT)
Dept: PULMONOLOGY | Facility: CLINIC | Age: 44
End: 2024-12-16
Payer: COMMERCIAL

## 2024-12-16 ENCOUNTER — PATIENT MESSAGE (OUTPATIENT)
Dept: CARDIOLOGY | Facility: CLINIC | Age: 44
End: 2024-12-16
Payer: COMMERCIAL

## 2024-12-16 VITALS
RESPIRATION RATE: 21 BRPM | HEIGHT: 69 IN | BODY MASS INDEX: 31.02 KG/M2 | DIASTOLIC BLOOD PRESSURE: 80 MMHG | SYSTOLIC BLOOD PRESSURE: 118 MMHG | HEART RATE: 79 BPM | WEIGHT: 209.44 LBS

## 2024-12-16 DIAGNOSIS — G47.8 UPPER AIRWAY RESISTANCE SYNDROME: Primary | ICD-10-CM

## 2024-12-16 DIAGNOSIS — Z56.6 WORK-RELATED STRESS: ICD-10-CM

## 2024-12-16 PROCEDURE — 99999 PR PBB SHADOW E&M-EST. PATIENT-LVL III: CPT | Mod: PBBFAC,,, | Performed by: STUDENT IN AN ORGANIZED HEALTH CARE EDUCATION/TRAINING PROGRAM

## 2024-12-16 PROCEDURE — 99213 OFFICE O/P EST LOW 20 MIN: CPT | Mod: S$GLB,,, | Performed by: STUDENT IN AN ORGANIZED HEALTH CARE EDUCATION/TRAINING PROGRAM

## 2024-12-16 SDOH — SOCIAL DETERMINANTS OF HEALTH (SDOH): OTHER PHYSICAL AND MENTAL STRAIN RELATED TO WORK: Z56.6

## 2024-12-16 NOTE — PROGRESS NOTES
Chief complaint:  Chief Complaint   Patient presents with    Sleep Apnea        History of present illness -  VISHAL Aragon comes to clinic today referred from Cardiology.  She was seen a few months ago for palpitations and an abnormal echocardiogram.  She was sent to the sleep clinic to rule out SHEYLA as a contributor to her cardiac symptoms.  She does not really snore on a regular basis, has been having non refreshing sleep for a few months but she believes is probably related to anxiety and stress mostly from work.      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    STOP-Bang Questionnaire (validated SHEYLA screening questionnaire)  Negative unless checked off.  [] Snoring    [x]  Tired/Fatigued/Sleepy  [] Obstruction (apneas/choking)  [] Pressure (HTN)  [] BMI >35  [] Age >50  [] Neck >40 cm  [] Gender male   STOP-Bang = 1 (low risk 0-2,high risk 3-8)    Physical examination -   Physical Exam  Vitals and nursing note reviewed.   Constitutional:       Appearance: Normal appearance.   HENT:      Head: Normocephalic and atraumatic.      Nose: Nose normal.      Mouth/Throat:      Mouth: Mucous membranes are moist.   Eyes:      Pupils: Pupils are equal, round, and reactive to light.   Neck:      Comments: Neck is 12 in   Mallampati score is 1  Cardiovascular:      Rate and Rhythm: Normal rate and regular rhythm.      Pulses: Normal pulses.      Heart sounds: Normal heart sounds.   Pulmonary:      Effort: Pulmonary effort is normal.      Breath sounds: Normal breath sounds.   Abdominal:      General: Abdomen is flat.      Palpations: Abdomen is soft.   Musculoskeletal:         General: No swelling.   Skin:     General: Skin is warm.      Capillary Refill: Capillary refill takes less than 2 seconds.   Neurological:      General: No focal deficit present.      Mental Status: She is alert.        Vitals:    12/16/24 0725  "  BP: 118/80   BP Location: Left arm   Patient Position: Sitting   Pulse: 79   Resp: (!) 21   Weight: 95 kg (209 lb 7 oz)   Height: 5' 9" (1.753 m)      Review of Systems   Constitutional: Negative.    HENT: Negative.     Eyes: Negative.    Respiratory: Negative.     Cardiovascular: Negative.    Gastrointestinal: Negative.    Musculoskeletal: Negative.    Skin: Negative.    Neurological: Negative.        Relevant data (Independently reviewed by me) -    Prior notes -   Cardiology and primary care    Imaging -   07/12/2024 TTE shows eccentric hypertrophy as well as a systolic pulmonary artery pressure of 30 mmHg.    Sleep studies -   12/06/2024 home sleep study nondiagnostic for SHEYLA, she slept for approximately 6 hours with a good efficiency of 96%    Assessment & Plan    Upper airway resistance syndrome    Work-related stress    Study non diagnostic for SHEYLA, patient was given instructions about sleep hygiene in order to hopefully improve the quality of her sleep.  She is to return to clinic as needed    Jose Guadalupe Arevalo   12/16/2024  "

## 2025-01-14 ENCOUNTER — PROCEDURE VISIT (OUTPATIENT)
Facility: CLINIC | Age: 45
End: 2025-01-14
Payer: COMMERCIAL

## 2025-01-14 VITALS
HEIGHT: 69 IN | DIASTOLIC BLOOD PRESSURE: 87 MMHG | BODY MASS INDEX: 31.02 KG/M2 | HEART RATE: 84 BPM | SYSTOLIC BLOOD PRESSURE: 131 MMHG | WEIGHT: 209.44 LBS

## 2025-01-14 DIAGNOSIS — M54.81 BILATERAL OCCIPITAL NEURALGIA: ICD-10-CM

## 2025-01-14 DIAGNOSIS — G43.709 CHRONIC MIGRAINE WITHOUT AURA WITHOUT STATUS MIGRAINOSUS, NOT INTRACTABLE: Primary | ICD-10-CM

## 2025-01-14 PROCEDURE — 99214 OFFICE O/P EST MOD 30 MIN: CPT | Mod: 25,S$GLB,,

## 2025-01-14 PROCEDURE — 64615 CHEMODENERV MUSC MIGRAINE: CPT | Mod: S$GLB,,,

## 2025-01-14 RX ORDER — ALMOTRIPTAN 12.5 MG/1
12.5 TABLET, FILM COATED ORAL 2 TIMES DAILY PRN
Qty: 12 TABLET | Refills: 5 | Status: SHIPPED | OUTPATIENT
Start: 2025-01-14 | End: 2026-01-14

## 2025-01-14 RX ORDER — RIMEGEPANT SULFATE 75 MG/75MG
75 TABLET, ORALLY DISINTEGRATING ORAL DAILY PRN
Qty: 8 TABLET | Refills: 12 | Status: SHIPPED | OUTPATIENT
Start: 2025-01-14

## 2025-01-14 RX ORDER — ATOGEPANT 60 MG/1
60 TABLET ORAL DAILY
Qty: 30 TABLET | Refills: 5 | Status: SHIPPED | OUTPATIENT
Start: 2025-01-14

## 2025-01-14 NOTE — PROGRESS NOTES
SUBJECTIVE:  Patient ID: Alicia Teixeira  Chief Complaint: No chief complaint on file.      History of Present Illness:  Alicia Teixeira is a 44 y.o. female presents to clinic alone for follow-up of headaches and Botox injections.       01/14/2025- Interval History: botox  She did notice weight loss since stopping emgality but did not start ajovy d/t not wanting to continue injectables. Having same amount of headaches but lasting longer.   Sleep study done - no SHEYLA found.   Axert worked well for her in the past. Would like to try again. Nurtec works well for menstrual migraines but not all the time.      10/22/2024- Interval History: botox  Pt reports ~60lb weight gain since starting emgality 5 years ago and caridiologist told her that emgality is known to cause weight gain. Interested in switching to Ajovy.         Recommendations made at last Office Visit on 8/2/24:  - continue emgality, botox and nurtec as rescue.      History of Present Illness:   43 y.o. female with chronic migraines, BON, IIH, cervical dystonia and radiculopathy, palpitations/PVCs, adhd, depression, kidney stones, DDD, osteoarthritis, who presents to clinic alone for evaluation of headaches.      Patient is a previous patient of Dr. Chicas, transferring care to me for treatment of chronic migraines. Current regimen includes: Emgality, Botox (last 5/6/24), nurtec     Reports that botox + Emgality has reduced her headache burden by more than 50%, from 30/30 days to 5/30 days and intensity from 10/10 to 5/10.      Patient is also established with pain management, s/p RFA in 5/2019. She then had Iovera with Dr. Chicas on 5/30/24.      PMHx negative for TBI, Meningitis, Aneurysms, Kidney Stones, asthma, GI bleed, osteoporosis, CAD/MI, CVA/TIA, DM, cancer, pregnancy         Headache History:  Onset - child  Previous Hx of HA -   Location/Radiation - L periorbital, bilateral occipitalis and radiates forward with ON  Quality - throbbing, stabbing,  aching   Duration - 1 day - 2 weeks   Intensity (range) - 5-9/10 ( higher when botox wearing off)  Frequency - 2/30 ha days per month, 1/30 are debilitating  Triggers - ovulation/menstruation, seafood  Aggravating Factors - light, sound  Alleviating Factors - lying down, dark cold room, ice  Recent Changes - improving  Prodrome/Aura - yes, vertical split/shift in vision and black spots  HA today? - no  Time of day of most headaches- anytime, can wake up with headache   Sleep - no snoring, wakes feeling refreshed, resolution of headache with sleep     Associated symptoms with the headache:   Meningeal symptoms - photophobia, phonophobia, exercise intolerance   Nausea/vomitting  Nasal drainage   Visual blurriness   Pallor/flushing  Dizziness   Vertigo  Confusion  Impaired concentration   Pain worsened with physical activity   Neck pain      Treatments Tried   Anti-inflammatories   Maxalt   Imitrex   Axert   Anti-seizure medications  Anti-depressants  Anti-HTN  Aimovig   Emgality  Sprix   Nurtec*   Ubrelvy   **no topamax s/t kidney stones       Current Medications:    Current Outpatient Medications:     buPROPion (WELLBUTRIN XL) 300 MG 24 hr tablet, Take 1 tablet (300 mg total) by mouth every morning., Disp: 90 tablet, Rfl: 3    dextroamphetamine-amphetamine (ADDERALL XR) 20 MG 24 hr capsule, Take 1 capsule (20 mg total) by mouth every morning., Disp: 30 capsule, Rfl: 0    dextroamphetamine-amphetamine (ADDERALL XR) 20 MG 24 hr capsule, Take 1 capsule (20 mg total) by mouth every morning., Disp: 30 capsule, Rfl: 0    [START ON 1/28/2025] dextroamphetamine-amphetamine (ADDERALL XR) 20 MG 24 hr capsule, Take 1 capsule (20 mg total) by mouth every morning., Disp: 30 capsule, Rfl: 0    fremanezumab-vfrm (AJOVY AUTOINJECTOR) 225 mg/1.5 mL autoinjector, Inject 1.5 mLs (225 mg total) into the skin every 28 days., Disp: 1 each, Rfl: 5    rimegepant (NURTEC) 75 mg odt, Take 1 tablet (75 mg total) by mouth daily as needed for  Migraine (once per 48 hours). Place ODT tablet on the tongue; alternatively the ODT tablet may be placed under the tongue, Disp: 8 tablet, Rfl: 12    Review of Systems - as per HPI, otherwise a balanced 10 systems review is negative.    OBJECTIVE:  Vitals:  There were no vitals taken for this visit.    Physical Exam:  Constitutional: she appears well-developed and well-nourished. she is well groomed. NAD   HENT:    Head: Normocephalic and atraumatic  Eyes: Conjunctivae and EOM are normal  Musculoskeletal: Normal range of motion. No joint stiffness.   Skin: Skin is warm and dry.  Psychiatric: Mood and affect are normal    Neuro: Patient is alert and oriented to person, place, and time. Language is intact and fluent.  Recent and remote memory are intact.  Normal attention and concentration.  Facial movement is symmetric.  Gait is normal.     Review of Data:   Notes from pcp, sleep reviewed.   Labs:  Office Visit on 12/03/2024   Component Date Value Ref Range Status    Cologuard Result 12/17/2024 Negative  Negative Final     Imaging:  Results for orders placed or performed during the hospital encounter of 12/12/19   MRI Brain Without Contrast    Narrative    EXAMINATION:  MRI BRAIN WITHOUT CONTRAST    CLINICAL HISTORY:  Confusion/delirium, altered LOC, unexplained;.  Disorientation, unspecified    TECHNIQUE:  Multiplanar multisequence MR imaging of the brain was performed without contrast.    COMPARISON:  CT 12/16/2011; MRI 12/15/2011    FINDINGS:  Intracranial compartment:    Ventricles are stable in size without evidence of hydrocephalus. No extra-axial blood or fluid collections.    The brain parenchyma appears normal. No mass lesion, acute hemorrhage, edema or acute infarct.    Normal vascular flow voids are preserved.    Skull/extracranial contents (limited evaluation): Bone marrow signal intensity is normal.      Impression    No intracranial abnormality identified.    Electronically signed by resident: Andrea  Kaylen  Date:    12/12/2019  Time:    14:41    Electronically signed by: Jaquan Kwok MD  Date:    12/12/2019  Time:    14:57   Results for orders placed or performed during the hospital encounter of 12/16/11   MRI Brain W WO Contrast    Narrative    DATE OF EXAM: Dec 15 2011      MRI   0007  -  MRI BRAIN W AND WO CONTRAST:   \  [id number]     CLINICAL HISTORY:   \HA S/P LP     PROCEDURE COMMENT:   \     ICD 9 CODE(S):   (\)     CPT 4 CODE(S)/MODIFIER(S):   (\)     Procedure: MRI the brain with andwithout contrast.     Technique: Sagittal and axial T1, axial T2, axial FLAIR, axial gradient,   axial diffusion, and axial, sagittal, and coronal postcontrast T1 images   of the whole brain.  16 cc of Omni scan gadolinium contrast was   administered.           Comparison: None     Findings: The brain parenchyma is normal in signal and contour. There are   no abnormal areas of parenchymal signal or enhancement. There are no   areas are restricted diffusion to suggest acute infarction. The   ventricles are normal in size and configuration without evidence for   hydrocephalus. There are several regions of the gradient susceptibility   within the ventricular system and cisterns.  This is suggestive of   pneumocephalus in this patient who is status post recent lumbar puncture.   No parenchymal gradient susceptibility is seen to suggest acute   hemorrhage.  The major intracranial T2  flow-voids are present.        Impression: Mild scattered pneumocephalus, noting patient had recent LP   procedure.         A preliminary report was given to ZAYNAB HORTON MD on 12/15/2011 at   8:13 PM by Delvin Pendleton.  ______________________________________      Electronically signed by resident: Marysol Tapia MD  Date:     12/16/11  Time:    08:34         ______________________________________      Electronically signed by: Robert Lentz MD  Date:     12/16/11  Time:    14:47            : DELON  Transcribe Date/Time: Dec 16 2011   2:48P  Dictated by : LÁZARO TAPIA MD  Read On: Dec 16 2011  8:35A  \  Images were reviewed, findings were verified and document was   electronically  SIGNED BY: ROBERT LENTZ MD On: Dec 16 2011  2:48P   LÁZARO TAPIA MD   CT Head Without Contrast    Narrative    DATE OF EXAM: Dec 16 2011      CT    0027  -  HEAD WITHOUT CONTRAST:   \  [id number]     CLINICAL HISTORY:   \HEADACHE     PROCEDURE COMMENT:   \     ICD 9 CODE(S):   (\)     CPT 4 CODE(S)/MODIFIER(S):   (\)     Comparison: MRI from 12/15/2011.     Findings: 5-mm axial noncontrast CT images were obtained of the brain.    Runner and sagittal reformats was process.     The brain demonstrates normal architecture.  There is evidence of   pneumocephalus in the ventricular system and basal cisterns.  This likely   relates to the patient's recent history of lumbar puncture and   corresponds to the finding seen on MRI.  Otherwise no parenchymal mass,   hemorrhage, abnormal calcifications are seen.  No extra-axial masses,   fluid collections or hemorrhage is seen.  The calvarium is intact.  The   ventricular system is normal in size.  Sinuses are clear.        Impression: Mild scattered pneumocephalus, likely from recent lumbar   puncture.  ______________________________________      Electronically signed by resident: Lázaro Tapia MD  Date:     12/16/11  Time:    11:09         ______________________________________      Electronically signed by: Robert Lentz MD  Date:     12/16/11  Time:    14:48            : DELON  Transcribe Date/Time: Dec 16 2011  2:48P  Dictated by : LÁZARO TAPIA MD  Read On: Dec 16 2011 11:09A  \  Images were reviewed, findings were verified and document was   electronically  SIGNED BY: ROBERT LENTZ MD On: Dec 16 2011  2:48P   LÁZARO TAPIA MD       Note: I have independently reviewed any/all imaging/labs/tests and agree with the report (s) as documented.  Any discrepancies will be as noted/demarcated by free text.   ROXANN, FNP-C 1/14/2025    ASSESSMENT:  1. Chronic migraine without aura without status migrainosus, not intractable    2. Bilateral occipital neuralgia        PLAN:  - preventative: stop ajovy. Start qulipta. continue botox. Medically necessary as patient has tried and failed several other medications (see full list above).   - Patient reports a 'wearing off effect' prior to the subsequent BOTOX injections at 12 weeks. This occurs at week 11. Based on this information, it is medically necessary for the patient to receive BOTOX therapy  - Patient has continued to achieve a greater than 50% reduction in the frequency of their migraines with continued Botox injections.  Wishes to proceed with today's injections as scheduled.   - rescue: add axert for additional rescue. continue nurtec prn. Medically necessary as patient has tried and failed several other medications (see full list above).   - BON: continue POC with pain management as uncontrolled ON can worsen migraines.   - Botox administered in clinic for Chronic Migraine (see below)   - RTC in 12 weeks for repeat Botox injections or sooner if needed          All questions and concerns addressed.  Patient verbalizes understanding and is agreeable with the above stated treatment plan.      PROCEDURE NOTE:  BOTOX was performed as an indicated therapy for intractable chronic migraine headaches given that the patient failed more than 2 headache medications    Two patient identifiers were confirmed with the patient prior to performing this procedure. A time out to determine correct patient and and agreement on procedure performed was conducted prior to the procedure.      Botulinum Toxin Injection Procedure   Procedure: Botulinum toxin injection (09283)  After risks and benefits were explained including bleeding, infection, worsening of pain, damage to the areas being injected, weakness of muscles, loss of muscle control, dysphagia if injecting the head or neck, facial  droop if injecting the facial area, painful injection, allergic or other reaction to the medications being injected, and the failure of the procedure to help the problem, a signed consent was obtained.   The patient was placed in a comfortable area and the sites to be treated were identified.The area to be treated was prepped three times with alcohol and the alcohol allowed to dry. Next, a 30 gauge needle was used to inject the medication in the area to be treated.      Total Botox used: 155 Units   Botox wastage: 45 Units     Injection sites:    muscle bilaterally ( a total of 10 units divided into 2 sites)   Procerus muscle (5 units)   Frontalis muscle bilaterally (a total of 20 units divided into 4 sites)   Temporalis muscle bilaterally (a total of 40 units divided into 8 sites)   Occipitalis muscle bilaterally (a total of 30 units divided into 6 sites)   Cervical paraspinal muscles (a total of 20 units divided into 4 sites)   Trapezius muscle bilaterally (a total of 30 units divided into 6 sites)   Complications: none   RTC for the next Botox injection: 12 weeks     The patient tolerated the procedure well and did not experience any complications.   Prior to initiation of botox the patient was experiencing >15 days of headache lasting 4 or more hours and has had sustained reduction.    CC: Montgomery, L Lee, MD Monique Smith, FNP-C [facility] Neu[facility]   [phone]

## 2025-01-15 ENCOUNTER — TELEPHONE (OUTPATIENT)
Dept: NEUROLOGY | Facility: CLINIC | Age: 45
End: 2025-01-15
Payer: COMMERCIAL

## 2025-01-15 NOTE — TELEPHONE ENCOUNTER
Called patient regarding visit tomorrow, as she is established with another neurologist currently.     She stated that she would like to est care closer to home and continue with botox here. She asked to cancel her follow up with the other provider and reschedule her appt for tomorrow to be a month out.     Rescheduled her new patent appt with us to be in 1 month, canceled her follow up appt with the other neuro provider, and advised that we only do the botox procedures at the [place] location. She verbalized understanding.

## 2025-02-12 ENCOUNTER — TELEPHONE (OUTPATIENT)
Dept: NEUROLOGY | Facility: CLINIC | Age: 45
End: 2025-02-12
Payer: COMMERCIAL

## 2025-02-13 ENCOUNTER — OFFICE VISIT (OUTPATIENT)
Dept: NEUROLOGY | Facility: CLINIC | Age: 45
End: 2025-02-13
Payer: COMMERCIAL

## 2025-02-13 ENCOUNTER — LAB VISIT (OUTPATIENT)
Dept: LAB | Facility: HOSPITAL | Age: 45
End: 2025-02-13
Payer: COMMERCIAL

## 2025-02-13 VITALS
WEIGHT: 206.38 LBS | HEART RATE: 88 BPM | HEIGHT: 69 IN | DIASTOLIC BLOOD PRESSURE: 70 MMHG | SYSTOLIC BLOOD PRESSURE: 123 MMHG | OXYGEN SATURATION: 99 % | BODY MASS INDEX: 30.57 KG/M2 | RESPIRATION RATE: 16 BRPM

## 2025-02-13 DIAGNOSIS — G44.229 CHRONIC TENSION-TYPE HEADACHE, NOT INTRACTABLE: ICD-10-CM

## 2025-02-13 DIAGNOSIS — M54.2 NECK PAIN: ICD-10-CM

## 2025-02-13 DIAGNOSIS — R11.0 NAUSEA: ICD-10-CM

## 2025-02-13 DIAGNOSIS — G44.89 CHRONIC MIXED HEADACHE SYNDROME: ICD-10-CM

## 2025-02-13 DIAGNOSIS — R42 DIZZINESS AND GIDDINESS: ICD-10-CM

## 2025-02-13 DIAGNOSIS — M54.81 BILATERAL OCCIPITAL NEURALGIA: ICD-10-CM

## 2025-02-13 DIAGNOSIS — H93.11 OBJECTIVE TINNITUS OF RIGHT EAR: ICD-10-CM

## 2025-02-13 DIAGNOSIS — G43.E19 INTRACTABLE CHRONIC MIGRAINE WITH AURA AND WITHOUT STATUS MIGRAINOSUS: ICD-10-CM

## 2025-02-13 DIAGNOSIS — G43.E19 INTRACTABLE CHRONIC MIGRAINE WITH AURA AND WITHOUT STATUS MIGRAINOSUS: Primary | ICD-10-CM

## 2025-02-13 PROCEDURE — 83090 ASSAY OF HOMOCYSTEINE: CPT

## 2025-02-13 PROCEDURE — 82607 VITAMIN B-12: CPT

## 2025-02-13 PROCEDURE — 85652 RBC SED RATE AUTOMATED: CPT

## 2025-02-13 PROCEDURE — 87389 HIV-1 AG W/HIV-1&-2 AB AG IA: CPT

## 2025-02-13 PROCEDURE — 86593 SYPHILIS TEST NON-TREP QUANT: CPT

## 2025-02-13 PROCEDURE — 84702 CHORIONIC GONADOTROPIN TEST: CPT

## 2025-02-13 PROCEDURE — 82652 VIT D 1 25-DIHYDROXY: CPT

## 2025-02-13 PROCEDURE — 84425 ASSAY OF VITAMIN B-1: CPT

## 2025-02-13 PROCEDURE — 82565 ASSAY OF CREATININE: CPT

## 2025-02-13 PROCEDURE — 82746 ASSAY OF FOLIC ACID SERUM: CPT

## 2025-02-13 PROCEDURE — 99999 PR PBB SHADOW E&M-EST. PATIENT-LVL V: CPT | Mod: PBBFAC,,,

## 2025-02-13 PROCEDURE — 86140 C-REACTIVE PROTEIN: CPT

## 2025-02-13 PROCEDURE — 86038 ANTINUCLEAR ANTIBODIES: CPT

## 2025-02-13 RX ORDER — PROMETHAZINE HYDROCHLORIDE 25 MG/1
25 TABLET ORAL EVERY 6 HOURS PRN
Qty: 30 TABLET | Refills: 0 | Status: SHIPPED | OUTPATIENT
Start: 2025-02-13 | End: 2025-03-15

## 2025-02-13 NOTE — PROGRESS NOTES
"Subjective:       Patient ID: Alicia Teixeira is a 44 y.o. female.      Chief Complaint: "Headaches and establish care for Botox"      HPI    HPI 44 y.o.  Years old female   with PMHx of  has a past medical history of Allergy, Generalized headaches, Herpes zoster without complication, History of concussion, History of kidney stones, Migraine headache, and Vitamin D deficiency.  and other medical conditions came  for the evaluation and recommendation of "Headaches and establish care for Botox".    -Patient referred by No referring provider defined for this encounter.     -Patient previously established with [facility] in [place] with Dr. Chicas for management of Bilateral Occipital Neuralgia and Chronic Migraines, last seen 04/2024. She presents to establish care closer to home in the [facility].     The patient is accompanied by self.    Headache History:    Started: Migraines started at 7 years old / worsened in 2010 post meningitis / Currently she reports they are controlled on current regimen.  Describes:   Migraines are are aching and throbbing-like, building up slowly. Headaches do occasionally wake them up from sleep. They interfere with daily activities.  Occipital Neuralgia Headaches are burning and shooting and associated with scalp tenderness bilaterally (worse to the right side)  Timing:   Duration of 1 week for Occipital Neuralgia  Duration of 3 days for Migraines   Duration of 2 hours for Tension Type Headaches  Frequency:  5 Occipital Neuralgia Headaches per month  6 Migraines per month  1 tension type Headache per month    Pain: Increasing in severity, rated 5-8/10, causing significant morbidity.  Location:   Occipital Neuralgia: starts to bilateral neck and extends up to bilateral parietal areas  Migraine: Generalized lobes, occasionally affecting right or left side   Tension type: band-like pattern around generalized lobes     Medications: Botox / Qulipta / Nurtec / AXERT  Worsen: " Pain worsened with physical activity, lack of sleep, weather changes (bad or rainy weather), Stress  Alleviated: Dark and quiet room, cold towels to the forehead.  Associated symptoms: Dizziness  / photophobia / phonophobia  / Nausea / Impaired concentration / Neck pain - non radiating / scalp sensitivity / ear ringing (right ear)     Pertinent Negative Symptoms: No associated neurological deficits, vomiting, neck pain with radiation, balance issues, acute thunderclap onset, double or blurry vision, focal or bilateral limb weakness, or extremity numbness and tingling.    Triggers: Menstrual cycle and ovulation / Seafood / Red Wine / Bad Weather / Flashing lights / Loud noises (at music concert) / Heat  Positional/Behavioral Factors: Headaches are positional and postural, worsened by movement and bending the head downward. Denies worsening with Valsalva maneuver.  Prodrome symptoms: Does report visual aura of split vision, she is unsure if it is bilateral or unilateral, goes away when migraine starts        Family History: Notable for migraines in son; no family history of early dementia.      Exclusions: No TMJ issues, seizures, smoking history, caffeine overuse, vertigo, blackouts, fever, chills, or significant memory loss. No history of strokes or falls.    Head Trauma History: Patient reports a history of head trauma sustained in high school, when they were kicked in the head by a horse. Additionally, the patient has a past medical history (PMH) of meningitis, diagnosed in 2010, which required hospitalization.    Ophthalmological History: Last eye clinic appointment 1 year ago, with normal pressures, no papilledema, and no abnormalities reported per patient. PMH of right lazy eye and routine ophthalmology visits every 12 months.    Sleep History: Sleep study done - no SHEYLA found / No symptoms of sleep apnea (e.g., snoring, gasping, frequent nighttime awakening, daytime fatigue).     Current Treatment Regimen:  The  "patient is currently prescribed Qulipta 60 mg PO daily, which was initiated approximately three weeks ago. She reports experiencing fatigue and nausea, which has led to her adjusting the dosing to QHS (at bedtime) rather than daily. She notes that this adjustment has helped alleviate the side effects.  Additionally, the patient receives Botox injections every three months for migraine management, a treatment she has been undergoing since 2012.    For abortive therapy, the patient is prescribed Nurtec 75 mg PO daily PRN, which she finds effective for managing migraines associated with her menstrual cycle. She also reports that AXERT is effective in aborting migraines triggered by adverse weather conditions or stress.  The patient denies any association between Adderall use and her migraines. She does not take Adderall daily, choosing instead to take breaks on weekends, holidays, and vacations.    The patient also describes a "wearing off effect" prior to her subsequent Botox injections, typically occurring at week 11. Given this information, it is medically necessary for the patient to continue Botox therapy, as she has maintained a greater than 50% reduction in the frequency of migraines with ongoing Botox treatments. The patient wishes to proceed with further injections.    Botox Treatment:  The patient last received Botox injections on 01/14/2025. She reports a significant improvement in her headache frequency and intensity with the current combination of Botox, Qulipta, AXERT, and Nurtec. Specifically, her headache frequency has decreased by more than 50%, from experiencing headaches on 30/30 days to 6/30 days. Additionally, the intensity of her headaches has reduced from a severity of 8/10 to 5/10.     Pain Management Plan:  The patient has a history of uncontrolled occipital neuralgia (ON), which can exacerbate her migraines. The most recent nerve block was administered on 05/30/2024, followed by " cryoneurolysis. A total of 4 nerves at 4 sites were treated using 12 treatment cycles. The patient reports significant improvement post-treatment.  The patient also has a history of nerve ablation performed twice, though she notes that the pain typically returns after about one year.  She is requesting to re-establish care with a local pain management provider to obtain another occipital nerve block for ongoing pain relief.    Regarding antiemetic therapy, the patient reports that Zofran is not effective for her, but Promethazine has been beneficial in controlling nausea.    Abortive therapies (tried and failed): Nurtec / AXERT - Current     Anti-inflammatories     Maxalt     Imitrex     Sprix     Axert      Preventative therapies (tried and failed):  Qulipta / Botox  - Current    no topamax s/t kidney stones     Aimovig     Ajovy     Emgality - The patient reports approximately a 60-pound weight gain since starting Emgality five years ago. The cardiologist has informed her that weight gain is a known side effect of Emgality. She also notes that she has experienced some weight loss (30 lbs) since discontinuing the medication about 3 months ago.     Qulipta     Anti-HTN (propranoloL (INDERAL LA) 60 MG)    Anti-seizure medications (tried Topamax prior to kidney stones)    Anti-depressants     Pregnancy and birth control: None  /  PMH of vasectomny         Review of Systems   Constitutional:  Negative for activity change, appetite change, chills, diaphoresis, fatigue, fever and unexpected weight change.   HENT:  Positive for tinnitus. Negative for congestion, dental problem, drooling, ear discharge, ear pain, facial swelling, hearing loss, mouth sores, nosebleeds, postnasal drip, rhinorrhea, sinus pressure, sinus pain, sneezing, sore throat, trouble swallowing and voice change.    Eyes:  Positive for photophobia and visual disturbance. Negative for pain, discharge, redness and itching.   Respiratory:  Negative  for cough, chest tightness, shortness of breath and wheezing.    Cardiovascular:  Negative for chest pain, palpitations and leg swelling.   Gastrointestinal:  Positive for nausea. Negative for abdominal distention, abdominal pain, blood in stool, constipation, diarrhea and vomiting.   Endocrine: Negative for cold intolerance, heat intolerance, polydipsia, polyphagia and polyuria.   Genitourinary:  Negative for decreased urine volume, difficulty urinating, dysuria, flank pain, frequency, hematuria, pelvic pain, urgency and vaginal discharge.   Musculoskeletal:  Positive for neck pain. Negative for arthralgias, back pain, gait problem, joint swelling, myalgias and neck stiffness.   Skin:  Negative for color change and rash.   Allergic/Immunologic: Negative for immunocompromised state.   Neurological:  Positive for dizziness and headaches. Negative for tremors, seizures, syncope, facial asymmetry, speech difficulty, weakness, light-headedness and numbness.        Impaired concentration / scalp sensitivity   Hematological:  Negative for adenopathy. Does not bruise/bleed easily.   Psychiatric/Behavioral:  Negative for agitation, behavioral problems, confusion (ea), decreased concentration, dysphoric mood, hallucinations, self-injury, sleep disturbance and suicidal ideas. The patient is not nervous/anxious and is not hyperactive.    All other systems reviewed and are negative.                Current Outpatient Medications:     almotriptan (AXERT) 12.5 MG tablet, Take 1 tablet (12.5 mg total) by mouth 2 (two) times daily as needed for Migraine. may repeat in 2 hours if needed, Disp: 12 tablet, Rfl: 5    atogepant (QULIPTA) 60 mg Tab, Take 1 tablet (60 mg total) by mouth once daily., Disp: 30 tablet, Rfl: 5    buPROPion (WELLBUTRIN XL) 300 MG 24 hr tablet, Take 1 tablet (300 mg total) by mouth every morning., Disp: 90 tablet, Rfl: 3    dextroamphetamine-amphetamine (ADDERALL XR) 20 MG 24 hr capsule, Take 1 capsule (20 mg  total) by mouth every morning., Disp: 30 capsule, Rfl: 0    dextroamphetamine-amphetamine (ADDERALL XR) 20 MG 24 hr capsule, Take 1 capsule (20 mg total) by mouth every morning., Disp: 30 capsule, Rfl: 0    dextroamphetamine-amphetamine (ADDERALL XR) 20 MG 24 hr capsule, Take 1 capsule (20 mg total) by mouth every morning., Disp: 30 capsule, Rfl: 0    rimegepant (NURTEC) 75 mg odt, Take 1 tablet (75 mg total) by mouth daily as needed for Migraine (once per 48 hours). Place ODT tablet on the tongue; alternatively the ODT tablet may be placed under the tongue, Disp: 8 tablet, Rfl: 12    Past Medical History:   Diagnosis Date    Allergy     Generalized headaches     Herpes zoster without complication 04/11/2022    diagnosed by [facility]    History of concussion 11/16/2017 5/2012, hit head after passing out, +LOC    History of kidney stones 2022    Migraine headache     followed by [facility]    Vitamin D deficiency        Past Surgical History:   Procedure Laterality Date    ARTHROSCOPIC REPAIR OF ROTATOR CUFF OF SHOULDER  10/12/2021    Dr. Chaves    BREAST SURGERY      excess tissue outer breast/axillary area removed - right    INJECTION OF ANESTHETIC AGENT AROUND NERVE Bilateral 04/16/2019    Procedure: BLOCK, NERVE, GREATER AND LESSER OCCIPITAL Need Consent;  Surgeon: Isabelle River MD;  Location: [facility];  Service: Pain Management;  Laterality: Bilateral;    INJECTION OF ANESTHETIC AGENT AROUND NERVE Right 09/11/2021    Procedure: BLOCK, NERVE GREATER AND LESSER OCCIPITAL;  Surgeon: Isabelle River MD;  Location: [facility];  Service: Pain Management;  Laterality: Right;    RADIOFREQUENCY ABLATION Bilateral 04/08/2022    Procedure: B/L Greater and Lesser Occipital Nerve RFA;  Surgeon: Manuel Garnre DO;  Location: [facility] OR;  Service: Pain Management;  Laterality: Bilateral;       Social History     Socioeconomic History    Marital status:    Occupational History     Occupation: computer - IT   Tobacco Use    Smoking status: Never     Passive exposure: Past    Smokeless tobacco: Never   Substance and Sexual Activity    Alcohol use: Not Currently     Comment: occasional - once a month - two drinks per occasion    Drug use: Never    Sexual activity: Yes     Partners: Male     Birth control/protection: None     Social Drivers of Health     Financial Resource Strain: Low Risk  (1/31/2024)    Overall Financial Resource Strain (CARDIA)     Difficulty of Paying Living Expenses: Not hard at all   Food Insecurity: No Food Insecurity (1/31/2024)    Hunger Vital Sign     Worried About Running Out of Food in the Last Year: Never true     Ran Out of Food in the Last Year: Never true   Transportation Needs: No Transportation Needs (1/31/2024)    PRAPARE - Transportation     Lack of Transportation (Medical): No     Lack of Transportation (Non-Medical): No   Physical Activity: Insufficiently Active (1/31/2024)    Exercise Vital Sign     Days of Exercise per Week: 4 days     Minutes of Exercise per Session: 30 min   Stress: Stress Concern Present (1/31/2024)    Citizen of [place] [employer] Occup[employer] - Occupational Stress Questionnaire     Feeling of Stress : To some extent   Housing Stability: Low Risk  (1/31/2024)    Housing Stability Vital Sign     Unable to Pay for Housing in the Last Year: No     Number of Places Lived in the Last Year: 1     Unstable Housing in the Last Year: No         Past/Current Medical/Surgical History, Past/Current Social History, Past/Current Family History and Past/Current Medications were reviewed in detail.    Objective:           VITAL SIGNS WERE REVIEWED      GENERAL APPEARANCE:     The patient looks comfortable.    BMI    No signs of respiratory distress.    Normal breathing pattern.    No dysmorphic features    Normal eye contact.       GENERAL MEDICAL EXAM:    HEENT:  Head is atraumatic normocephalic.     FUNDUSCOPIC (OPHTHALMOSCOPIC) EXAMINATION showed  no disc edema (papilledema).      NECK: No JVD. No visible lesions or goiters.     CHEST-CARDIOPULMONARY: No cyanosis. No tachypnea. Normal respiratory effort.    [id number]: No jaundice. No stomas or lesions. No visible hernias. No catheters.     SKIN, HAIR, NAILS: No pathognomonic skin rash.No neurofibromatosis. No visible lesions.No stigmata of autoimmune disease. No clubbing.    LIMBS: No varicose veins. No visible swelling.    MUSCULOSKELETAL: No visible deformities.No visible lesions.             Neurological Exam  Mental Status  Awake, alert and oriented to person, place and time. Oriented to person, place, time and situation. Recent and remote memory are intact. At 5 minutes recalls 3 of 3 objects. Speech is normal. Language is fluent with no aphasia. Attention and concentration are normal. Fund of knowledge is appropriate for level of education. Apraxia absent.    Cranial Nerves  CN I: Sense of smell is normal.  CN II: Visual acuity is normal. Visual fields full to confrontation. Right funduscopic exam: disc intact. Left funduscopic exam: disc intact.  CN III, IV, VI: Extraocular movements intact bilaterally. Normal lids and orbits bilaterally. Pupils equal round and reactive to light bilaterally.  CN V: Facial sensation is normal.  CN VII: Full and symmetric facial movement.  CN VIII: Hearing is normal.  CN IX, X: Palate elevates symmetrically. Normal gag reflex.  CN XI: Shoulder shrug strength is normal.  CN XII: Tongue midline without atrophy or fasciculations.    Motor  Normal muscle bulk throughout. No fasciculations present. Normal muscle tone. No abnormal involuntary movements. Strength is 5/5 throughout all four extremities.    Sensory  Sensation is intact to light touch, pinprick, vibration and proprioception in all four extremities.    Reflexes  Deep tendon reflexes are 2+ and symmetric in all four extremities.    Coordination  Right: Finger-to-nose normal. Rapid  alternating movement normal. Heel-to-shin normal.Left: Finger-to-nose normal. Rapid alternating movement normal. Heel-to-shin normal.    Gait  Casual gait is normal including stance, stride, and arm swing.Normal toe walking. Normal heel walking. Normal tandem gait. Romberg is absent. Normal pull test. Able to rise from chair without using arms.        Lab Results   Component Value Date    WBC 6.94 06/21/2024    HGB 12.1 06/21/2024    HCT 38.0 06/21/2024    MCV 82 06/21/2024     06/21/2024       Sodium   Date Value Ref Range Status   06/21/2024 141 136 - 145 mmol/L Final     Potassium   Date Value Ref Range Status   06/21/2024 4.4 3.5 - 5.1 mmol/L Final     Chloride   Date Value Ref Range Status   06/21/2024 109 95 - 110 mmol/L Final     CO2   Date Value Ref Range Status   06/21/2024 22 (L) 23 - 29 mmol/L Final     Glucose   Date Value Ref Range Status   06/21/2024 89 70 - 110 mg/dL Final     BUN   Date Value Ref Range Status   06/21/2024 12 6 - 20 mg/dL Final     Creatinine   Date Value Ref Range Status   06/21/2024 0.9 0.5 - 1.4 mg/dL Final     Calcium   Date Value Ref Range Status   06/21/2024 9.2 8.7 - 10.5 mg/dL Final     Total Protein   Date Value Ref Range Status   06/21/2024 6.9 6.0 - 8.4 g/dL Final     Albumin   Date Value Ref Range Status   06/21/2024 3.4 (L) 3.5 - 5.2 g/dL Final     Total Bilirubin   Date Value Ref Range Status   06/21/2024 0.4 0.1 - 1.0 mg/dL Final     Comment:     For infants and newborns, interpretation of results should be based  on gestational age, weight and in agreement with clinical  observations.    Premature Infant recommended reference ranges:  Up to 24 hours.............<8.0 mg/dL  Up to 48 hours............<12.0 mg/dL  3-5 days..................<15.0 mg/dL  6-29 days.................<15.0 mg/dL       Alkaline Phosphatase   Date Value Ref Range Status   06/21/2024 103 55 - 135 U/L Final     AST   Date Value Ref Range Status   06/21/2024 12 10 - 40 U/L Final     ALT  "  Date Value Ref Range Status   06/21/2024 11 10 - 44 U/L Final     Anion Gap   Date Value Ref Range Status   06/21/2024 10 8 - 16 mmol/L Final     eGFR if    Date Value Ref Range Status   02/03/2022 >60 >60 mL/min/1.73 m^2 Final     eGFR if non    Date Value Ref Range Status   02/03/2022 >60 >60 mL/min/1.73 m^2 Final     Comment:     Calculation used to obtain the estimated glomerular filtration  rate (eGFR) is the CKD-EPI equation.          Lab Results   Component Value Date    NVFHNOVT75 422 03/02/2018       Lab Results   Component Value Date    TSH 2.408 06/21/2024       No results found in the last 24 hours.    No results found in the last 24 hours.    Reviewed the neuroimaging independently       Assessment:   44 y.o. Years old female  with PMH as above came for an evaluation of "Headaches and establish care for Botox"    1. Intractable chronic migraine with aura and without status migrainosus  onabotulinumtoxina injection 200 Units    Homocysteine, Serum    Folate    Vitamin B12    Vitamin B1    Sedimentation rate    C-Reactive Protein    KARRIE Screen w/Reflex    HIV 1/2 Ag/Ab (4th Gen)    Treponema Pallidium Antibodies IgG, IgM    Ambulatory Referral/Consult to Physical Therapy/Occupational Therapy    Calcitriol    HCG, QUANTITATIVE, PREGNANCY    Creatinine, Serum    MRI Brain W WO Contrast    Prior authorization Order    CANCELED: Prior authorization Order      2. Chronic mixed headache syndrome        3. Bilateral occipital neuralgia  Ambulatory referral/consult to Pain Clinic    MRI Brain W WO Contrast      4. Chronic tension-type headache, not intractable        5. Dizziness and giddiness  Ambulatory Referral/Consult to Physical Therapy/Occupational Therapy      6. Nausea  promethazine (PHENERGAN) 25 MG tablet      7. Neck pain  X-Ray Cervical Spine Complete 5 view      8. Objective tinnitus of right ear             Plan:   Patient Neurological Assessment is non-focal. "     HEADACHE PLAN    -Patient instructed to keep a headache diary for review at next follow-up visit.     -Order Brain MRI W WO Contrast to rule out structural abnormalities contributing to Headaches. We will schedule follow up appointment after MRI brain has been completed. Ordered Serum Creatinine to assess kidney function prior to MRI with Contrast    -Order PT Referral for Headache Prevention Therapy / Neck therapy / Vestibular Therapy.     -Continue routine ophthalmology evaluation.     -All medication indications and potential side effects were discussed in detail with the patient. Informational pamphlets were provided for further reference. The patient verbally confirmed full understanding of the medications and their instructions.    -Continue Botox, Qulipta, AXERT, and Nurtec as prescribed - effective in controlling Migraines per patient. No refills needed.     Botox Treatment:  The patient last received Botox injections on 01/14/2025. She reports a significant improvement in her headache frequency and intensity with the current combination of Botox, Qulipta, AXERT, and Nurtec. Specifically, her headache frequency has decreased by more than 50%, from experiencing headaches on 30/30 days to 6/30 days. Additionally, the intensity of her headaches has reduced from a severity of 8/10 to 5/10.     Pain Management Plan:  The patient has a history of uncontrolled occipital neuralgia (ON), which can exacerbate her migraines. The most recent nerve block was administered on 05/30/2024, followed by cryoneurolysis. A total of 4 nerves at 4 sites were treated using 12 treatment cycles. The patient reports significant improvement post-treatment.  The patient also has a history of nerve ablation performed twice, though she notes that the pain typically returns after about one year.  She is requesting to re-establish care with a local pain management provider to obtain another occipital nerve block for ongoing pain relief.  Ordered referral today.     Regarding antiemetic therapy, the patient reports that Zofran is not effective for her, but Promethazine has been beneficial in controlling nausea.     -Next Botox due (04/01/2025)     1. Intractable chronic migraine with aura and without status migrainosus  - onabotulinumtoxina injection 200 Units  - Homocysteine, Serum; Future  - Folate; Future  - Vitamin B12; Future  - Vitamin B1; Future  - Sedimentation rate; Future  - C-Reactive Protein; Future  - KARRIE Screen w/Reflex; Future  - HIV 1/2 Ag/Ab (4th Gen); Future  - Treponema Pallidium Antibodies IgG, IgM; Future  - Ambulatory Referral/Consult to Physical Therapy/Occupational Therapy; Future  - Calcitriol; Future  - HCG, QUANTITATIVE, PREGNANCY; Future  - Creatinine, Serum; Future  - MRI Brain W WO Contrast; Future  - Prior authorization Order    2. Chronic mixed headache syndrome    3. Bilateral occipital neuralgia  - Ambulatory referral/consult to Pain Clinic; Future  - MRI Brain W WO Contrast; Future    4. Chronic tension-type headache, not intractable    5. Dizziness and giddiness  - Ambulatory Referral/Consult to Physical Therapy/Occupational Therapy; Future    6. Nausea  - promethazine (PHENERGAN) 25 MG tablet; Take 1 tablet (25 mg total) by mouth every 6 (six) hours as needed for Nausea.  Dispense: 30 tablet; Refill: 0    7. Neck pain  - X-Ray Cervical Spine Complete 5 view; Future    8. Objective tinnitus of right ear          Medication List            Accurate as of February 13, 2025  8:57 AM. If you have any questions, ask your nurse or doctor.                CONTINUE taking these medications      almotriptan 12.5 MG tablet  Commonly known as: AXERT  Take 1 tablet (12.5 mg total) by mouth 2 (two) times daily as needed for Migraine. may repeat in 2 hours if needed     buPROPion 300 MG 24 hr tablet  Commonly known as: WELLBUTRIN XL  Take 1 tablet (300 mg total) by mouth every morning.     * dextroamphetamine-amphetamine 20 MG 24  hr capsule  Commonly known as: ADDERALL XR  Take 1 capsule (20 mg total) by mouth every morning.     * dextroamphetamine-amphetamine 20 MG 24 hr capsule  Commonly known as: ADDERALL XR  Take 1 capsule (20 mg total) by mouth every morning.     * dextroamphetamine-amphetamine 20 MG 24 hr capsule  Commonly known as: ADDERALL XR  Take 1 capsule (20 mg total) by mouth every morning.     NURTEC 75 mg odt  Generic drug: rimegepant  Take 1 tablet (75 mg total) by mouth daily as needed for Migraine (once per 48 hours). Place ODT tablet on the tongue; alternatively the ODT tablet may be placed under the tongue     QULIPTA 60 mg Tab  Generic drug: atogepant  Take 1 tablet (60 mg total) by mouth once daily.           * This list has 3 medication(s) that are the same as other medications prescribed for you. Read the directions carefully, and ask your doctor or other care provider to review them with you.                         LABORATORY EVALUATION    Labs: (2024) A1C / TSH / Lipid Panel / CBC / CMP /  -personally reviewed -non-significant abnormalities       RADIOLOGY EVALUATION     Personally Reviewed Home Sleep Study  - done 12/2024 - report only - Clinically significant sleep disordered breathing is not identified     Personally Reviewed  Brain MRI WO - done 2019 - no acute abnormalities    Personally Reviewed X-Ray Cervical Spine 5 View W Flex Extxt  - done 2019 - no acute abnormalities    Personally Reviewed  Brain MRI W WO - done 2011 - report only - no acute abnormalities / Mild scattered pneumocephalus, noting patient had recent LP procedure.           NEUROPHYSIOLOGY EVALUATION       PATHOLOGY EVALUATION        NEUROCOGNITIVE AND NEUROPSYCHOLOGY EVALUATION                  MIGRAINE, COMMON, WITH AURA, EPISODIC, HIGH FREQUENCY       MANAGEMENT       HEADACHE DIARY     DISCUSSED THE THREE-FOLD MANAGEMENT OF MIGRAINE:      LIFESTYLE CHANGES:       Good sleep hygiene  Avoid general triggers like lack of sleep/too much  sleep, prolonged sun exposure, excessive screen time and specific triggers based on you own diary   Minimize physical and emotional stress  Smoking avoidance and cessation  Limit caffeine drinks to 1-2 a day   Good hydration   Small frequent meals and avoid skipping meals   Moderate 30-minute-long aerobic exercises 3 times/week. Avoid strenuous exercise         ABORTIVE MEDICATIONS (ACUTE-RESCUE MEDICATIONS):     Should only be taken 2-3 times/week to avoid rebound and overuse headaches.    I-explained to the patient that pain meds especially triptans should NOT be taken daily to avoid Rebound Headache and Overuse Headache.    Take at the ONSET of the headache sumatriptan 100 mg PO  (or other Triptan) in combination with naproxen 500 mg PO or Ibuprofen 800 mg PO for headache without nausea or vomiting.  This regimen can be repeated only once in 24 hours after 2 hours.    Side effects of triptans were discussed and include rare cardiac and cerebral ischemia and cannot be used with migraine associate with focal neurological deficits (complicated migraine) in addition to drowsiness and potential impairment of driving ability. The patient verbalized understanding.    NSAIDs can cause peptic ulcers, renal insufficiency and may increase the risk of cardiovascular diseases.  SEs were discussed with the patient. The patient verbalized understanding.    Triptans have shown to be more effective than Gepatns with more SE/AE.      AVOID NARCOTICS (OPIATES)      1. No randomized controlled study shows pain-free results with opioids in the treatment of migraine.     2. The physiologic consequences of opioid use are adverse, occur quickly, and can be permanent. Decreased gray matter, release of calcitonin gene-related peptide, dynorphin, and pro-inflammatory peptides, and activation of excitatory glutamate receptors are all associated with opioid exposure.     3. Opioids are pro-nociceptive, prevent reversal of migraine central  sensitization, and interfere with triptan effectiveness.     4.Opioids precipitate bad clinical outcomes, especially transformation to daily headache.     5. They cause disease progression, comorbidity, and excessive health care consumption.           NEXT OPTIONS:    Triptans: Sumatriptan (Imitrex), Rizatriptan (Maxalt).    Gepants: Nuretc (rimegepant)75 mg >Ubrelvy (ubrogepant) 100 mg    Ditans: Reyvow (lasmiditan) 100 mg (No driving due to sedation)    Fioricet without codeine with Reglan.      Prednisone with Reglan.      LAST RESORT:     DHE NS Trudhesa (Max 2 a week)     C/I: concomitant use of vasoconstrictors like Triptans, strong CY inhibitors such as HAART PIs (eg, ritonavir, nelfinavir, or indinavir) and Macrolides (eg, erythromycin or clarithromycin), CAD, PVD, Stroke/TIA and Uncontrolled HTN.  Serious SEs include Vasospasm and Fibrosis (chronic use).       IMPENDING STATUS: Prednisone and Vistaril.    STATUS MIGRAINOSUS: ED-Infusion for Status Protocol.        PREVENTATIVE (MORE ACCURATELY MIGRAINE REDUCTION) MEDICATIONS:           Since the patient's headache is very frequent a lengthy discussion about preventative medications was carried out.The patient understands that prevention means DECREASING frequency and severity and NOT elimination.The patient was made aware that any new medication can cause serious allergic reaction.The medication is considered failure only if a therapeutic dose reached and maintained for 6-8 weeks.        HELPFUL SUPPLEMENTS:     Helpful supplements include Co-Q 10, B2, Mg, Feverfew (Dolovent combination) and butterbur (Petadolex)        NEUROPHARMACOLOGY     NEXT OPTIONS:       Zonisamide/Zonegran (ZNS) 100-400 mg QHS is a good alternative to TPM in case of SE/AE.     Amitriptyline/Elavil (TCA) slow titration to 100-Age which can cause sleepiness, dry eyes, dry mouth, urinary retention, and rarely cardiac arrhythmias    Lamotrigine/Lamictal  (LTG)slow titration to 100  mg BID which can cause serious skin rash and rare cardiac arrhythmias. LTG is superior to other therapies for specifically reducing migraine aura.     Botox 200 units every 3 months.         LAST RESORT OPTIONS:      Namenda 10 mg BID     Valproic acid/ Depakote         NEUROMODULATION     Cefaly, Relivion, Nerivio and GammaCore (VNS)               WOMEN IN CHILD BEARING PERIOD     All migraine medications are not safe during pregnancy and the patient was made aware of this fact. Any pregnancy should be planned, and medications should be stopped PRIOR to pregnancy planning. Folic acid 1 mg daily was recommended. However, hormonal birth control complicates the management of migraine and can exacerbate migraine. If possible, mechanical contraception should be a better option.         PREGNANCY ISSUES         We had a lengthy discussion about managing migraine in patients who are trying to get pregnant or pregnant.    First, I recommend FA 1 mg QD     PRN medications are not safe. The safest option is Reglan PRN and not to be taken more than 2-3 times/week. Fioricet PRN is an option.     Traditional preventative options are not safe including Botox. Cefaly and Relivion are considered safe, but insurance does not cover it.        SCHOOL AND WORK ACCOMMODATIONS        Allow the patient to wear sunglasses or a cap and switch out fluorescent bulbs.    Allow the patient to arrive 5 minutes later and leave 5 minutes earlier to avoid noisy traffic.    Allow the patient to carry a water bottle and refill as needed.    Allow the patient to snack whenever is needed.    Allow the patient to decrease the computer brightness.    Allow the patient to take breaks as needed and extra time for assignments and deadlines.    Allow the patient to avoid strenuous activity as needed.               OCCIPITAL, CERVICO-OCCIPITAL NEURALGIA            EVALUATION       Evaluate Brain WWO.    C-Spine MRI WWO.      MANAGEMENT       Try  Amitriptyline/Elavil. Will titrate very slowly to 75 mg QHS which can cause sleepiness, dry eyes, dry mouth, urinary retention, and rarely cardiac arrhythmias. The patient verbalized understanding of the most common SEs and was encouraged to read the leaflet for more details.     Try Nuretc 75 mg ODT PRN.    Prednisone 50 mg QD for status cases.      Heating pads PRN    Improve posture.    Avoid heavy lifting or sudden spine movements.         NEXT OPTIONS     Botox injections.      INTERVENTIONAL PAIN MANAGEMENT     Occipital Nerve Block (ONB).    Pulsed Radiofrequency (PRF).    Cryotherapy.    Occipital neuromodulation (neuro stimulator).      SURGICAL TREATMENT    Occipital nerve surgical decompression may benefit selected patients but should be reserved in extreme cases when all other modalities have failed to control the pain.             MEDICAL/SURGICAL COMORBIDITIES     All relevant medical comorbidities noted and managed by primary care physician and medical care team.          HEALTHY LIFESTYLE AND PREVENTATIVE CARE    The patient to adhere to the age-appropriate health maintenance guidelines including screening tests and vaccinations. The patient to adhere to  healthy lifestyle, optimal weight, exercise, healthy diet, good sleep hygiene and avoiding drugs including smoking, alcohol and recreational drugs.    I spent a total of 93 minutes on the day of the visit.This includes face to face time and non-face to face time preparing to see the patient (eg, review of tests), obtaining and/or reviewing separately obtained history, documenting clinical information in the electronic or other health record, independently interpreting results and communicating results to the patient/family/caregiver, or care coordinator.     Please do not hesitate to contact me with any updates, questions or concerns.    No follow-ups on file.    Jane Adamson, MSN, FNP-C    General Neurology

## 2025-02-14 LAB
CREAT SERPL-MCNC: 0.9 MG/DL (ref 0.5–1.4)
CRP SERPL-MCNC: 3.2 MG/L (ref 0–8.2)
ERYTHROCYTE [SEDIMENTATION RATE] IN BLOOD BY PHOTOMETRIC METHOD: 13 MM/HR (ref 0–36)
EST. GFR  (NO RACE VARIABLE): >60 ML/MIN/1.73 M^2
HCG INTACT+B SERPL-ACNC: <2.4 MIU/ML
HCYS SERPL-SCNC: 8.1 UMOL/L (ref 4–15.5)

## 2025-02-15 LAB
FOLATE SERPL-MCNC: 6.4 NG/ML (ref 4–24)
HIV 1+2 AB+HIV1 P24 AG SERPL QL IA: NORMAL
TREPONEMA PALLIDUM IGG+IGM AB [PRESENCE] IN SERUM OR PLASMA BY IMMUNOASSAY: NONREACTIVE
VIT B12 SERPL-MCNC: 612 PG/ML (ref 210–950)

## 2025-02-17 ENCOUNTER — RESULTS FOLLOW-UP (OUTPATIENT)
Dept: NEUROLOGY | Facility: CLINIC | Age: 45
End: 2025-02-17

## 2025-02-17 LAB
1,25(OH)2D3 SERPL-MCNC: 47 PG/ML (ref 20–79)
ANA SER QL IF: NORMAL

## 2025-02-18 ENCOUNTER — PATIENT MESSAGE (OUTPATIENT)
Facility: CLINIC | Age: 45
End: 2025-02-18
Payer: COMMERCIAL

## 2025-02-18 ENCOUNTER — OFFICE VISIT (OUTPATIENT)
Dept: PODIATRY | Facility: CLINIC | Age: 45
End: 2025-02-18
Payer: COMMERCIAL

## 2025-02-18 VITALS — WEIGHT: 206.38 LBS | HEIGHT: 69 IN | BODY MASS INDEX: 30.57 KG/M2

## 2025-02-18 DIAGNOSIS — B07.0 PLANTAR WART OF BOTH FEET: Primary | ICD-10-CM

## 2025-02-18 DIAGNOSIS — M79.671 BILATERAL FOOT PAIN: ICD-10-CM

## 2025-02-18 DIAGNOSIS — M79.672 BILATERAL FOOT PAIN: ICD-10-CM

## 2025-02-18 NOTE — PATIENT INSTRUCTIONS
"Plantar Wart Aftercare:     After 24 hours, you should wash off the area where treatment was applied to a wash cloth that you can throw away after. Gently wash the area off and leave a small film over the treated area (s). If severe stinging or burning occurs before the 24 hours are up, it is okay to wash the area sooner.      If blistering occurs, cleanse daily with an antibacterial soap and apply Polysporin or Vaseline ointment and a band-aid.      If a severe reaction does occur (large blistering, intense redness, pus, swelling, or pain), please call the office.      In 3 days, Purchase Compound W and start application once daily to wart sites.      Plantar Warts     Contrary to the old folk tale, you can't get warts from touching a toad. Warts are non-cancerous skin growths caused by human papillomavirus (HPV). This virus is passed from person to person where it enters the body through breaks in the skin. The time between contact to developing lesions can be months or longer.   Some people are more likely to get warts than others.  This may be due to the portal of entry of the wart virus, such as in children who bite their nails or pick at hangnails.  Patients with weaker immune systems are also more likely to get warts.     Warts are usually skin-colored and rough to the touch.  There are several kinds of warts such as common, plantar (foot), and flat.  Plantar warts usually occur on the soles of the feet. The HPV virus grows in warm, moist environments, such as those created in a locker room or in your shoes when your feet perspire and the moisture is trapped.  Plantar warts will often spread to other areas of the foot, increase in size, and have "babies," resulting in a cluster that resembles a mosaic. Plantar warts can erupt anywhere on the sole of the foot. They may be difficult to distinguish from calluses. However, you may be able to see tiny black dots on the surface layer of a plantar wart. These are " the ends of capillary blood vessels. .  Plantar warts can be very painful and tender. Standing and walking causes the wart to pinch the sensory nerves between the folds of skin which causes pain.     In children, warts can disappear without treatment over months to years.  In adults, this does not happen as easily or quickly.  Painful, rapidly multiplying warts should be treated.        Treatment  There are a variety of treatments for warts.  Most treatments aim to stimulate your bodys immune response to the lesion. The wart can resolve without therapy in many cases.  Salacylic acid in a solution or plaster (available over-the-counter) applied daily may take weeks to months of treatment.  Cantharone (bettle juice extract) and/or cryotherapy (freezing the wart) are the most common treatments in the medical office setting.  Cantharone is typically more effective and requires fewer applications than cryotherapy. Both of these treatments produce blistering of the skin and help the body eliminate the wart.  Surgical intervention (cutting the wart out), injections, and immunotherapy are other office-based therapies used less commonly for warts.  Duct tape applied to warts continuously (removed as it peels and reapplied after washing the area) for 6-12 weeks can also be effective for treatment.       Prevention  To reduce your risk for getting plantar warts, be sure to wear shower thongs or sandals when you use a public locker room or shower. Use foot powders and change your socks frequently to keep the feet dry.        More information on Plantar Wart:  Plantar Wart (Verruca Plantaris)       What is a Plantar Wart?  A wart is a small growth on the skin that develops when the skin is infected by a virus. Warts can develop anywhere on the foot, but typically they appear on the bottom (plantar side) of the foot. Plantar warts most commonly occur in children, adolescents, and the elderly.  There are two types of plantar  warts:  A solitary wart is a single wart. It often increases in size and may eventually multiply, forming additional satellite warts.   Mosaic warts are a cluster of several small warts growing closely together in one area. Mosaic warts are more difficult to treat than solitary warts.   Causes  Plantar warts are caused by direct contact with the human papilloma virus (HPV). This is the same virus that causes warts on other areas of the body.  Symptoms  The symptoms of a plantar wart may include:  Thickened skin. Often a plantar wart resembles a callus because of its tough, thick tissue.   Pain. Walking and standing may be painful. Squeezing the sides of the wart may also cause pain.   Tiny black dots. These often appear on the surface of the wart. The dots are actually dried blood contained in the capillaries (tiny blood vessels).   Plantar warts grow deep into the skin. Usually this growth occurs slowly, with the wart starting small and becoming larger over time.  Diagnosis and Treatment  To diagnose a plantar wart, the foot and ankle surgeon will examine the patients foot and look for signs and symptoms of a wart.  Although plantar warts may eventually clear up on their own, most patients desire faster relief. The goal of treatment is to completely remove the wart.  The foot and ankle surgeon may use topical or oral treatments, laser therapy, cryotherapy (freezing), acid treatments, or surgery to remove the wart.  Regardless of the treatment approaches undertaken, it is important that the patient follow the surgeons instructions, including all home care and medication that has been prescribed, as well as follow-up visits with the surgeon. Warts may return, requiring further treatment.  If there is no response to treatment, further diagnostic evaluation may be necessary. In such cases, the surgeon can perform a biopsy to rule out other potential causes for the growth.  Although there are many folk remedies for  warts, patients should be aware that these remain unproven and may be dangerous. Patients should never try to remove warts themselves. This can do more harm than good.  Copyright © 2011    [employer] ([employer]), All Rights Reserved.

## 2025-02-18 NOTE — PROGRESS NOTES
PODIATRIC MEDICINE AND SURGERY          CHIEF COMPLAINT   Chief Complaint   Patient presents with    Plantar Warts     Possible plantar warts to BLE. X 2 yrs Pt has area to LLE on plantar surface of foot. RLE to lateral aspect of plantar surface. Non diabetic. Pt 0/10 pain rating. Pt wears tennis shoes and socks. Last PCP appt was DR Diogenes Hutchison 12/3/2024.socks.       HPI  Alicia Teixeira is a 44 y.o. female who presents with complaints of painful lesions on bottom of both feet foot. States that lesion has been present for two year. They have tried SA  Patient denies other pedal complaints at this time.      PMH  Past Medical History:   Diagnosis Date    Allergy     Generalized headaches     Herpes zoster without complication 04/11/2022    diagnosed by [facility]    History of concussion 11/16/2017 5/2012, hit head after passing out, +LOC    History of kidney stones 2022    Migraine headache     followed by [facility]    Vitamin D deficiency        PROBLEM LIST  Patient Active Problem List    Diagnosis Date Noted    Shortness of breath 09/13/2024    BMI 32.0-32.9,adult 09/13/2024    Left ventricular dilatation 08/29/2024    Family history of cardiac disorder (PFO) 07/04/2024    Palpitations 07/04/2024    Acute left ankle pain 10/18/2023    Left leg pain 10/18/2023    ADHD, predominantly inattentive type 06/30/2022    Adjustment disorder with depressed mood 04/12/2022    Left ureteral stone 02/10/2022    History of kidney stones 2022    Frequent unifocal PVCs 04/28/2021    Recurrent mild major depressive disorder with anxiety 11/22/2019    Attention deficit 11/22/2019    Chronic pain 05/21/2019    Cervical dystonia 03/11/2019    DDD (degenerative disc disease), cervical 10/24/2018    Cervical radiculopathy 10/24/2018    Osteoarthritis of spine 10/24/2018    Vitamin D deficiency     Chronic daily headache 05/09/2017    Bilateral occipital neuralgia 05/09/2017    Chronic migraine without  aura without status migrainosus, not intractable 2017    IIH (idiopathic intracranial hypertension) 2017       MEDS  Medications Ordered Prior to Encounter[1]    PSH     Past Surgical History:   Procedure Laterality Date    ARTHROSCOPIC REPAIR OF ROTATOR CUFF OF SHOULDER  10/12/2021    Dr. Cahves    BREAST SURGERY      excess tissue outer breast/axillary area removed - right    INJECTION OF ANESTHETIC AGENT AROUND NERVE Bilateral 2019    Procedure: BLOCK, NERVE, GREATER AND LESSER OCCIPITAL Need Consent;  Surgeon: Isabelle River MD;  Location: [facility] [place] PAIN MGT;  Service: Pain Management;  Laterality: Bilateral;    INJECTION OF ANESTHETIC AGENT AROUND NERVE Right 2021    Procedure: BLOCK, NERVE GREATER AND LESSER OCCIPITAL;  Surgeon: Isabelle River MD;  Location: [facility] [place] PAIN MGT;  Service: Pain Management;  Laterality: Right;    RADIOFREQUENCY ABLATION Bilateral 2022    Procedure: B/L Greater and Lesser Occipital Nerve RFA;  Surgeon: Manuel Garner DO;  Location: [facility];  Service: Pain Management;  Laterality: Bilateral;        ALL  Review of patient's allergies indicates:   Allergen Reactions    Imitrex [sumatriptan succinate] Nausea Only       SOC   Social History[2]      Family HX    Family History   Problem Relation Name Age of Onset    Cancer Mother Maribell 42        breast    Breast cancer Mother Maribell 42    Hyperlipidemia Mother Maribell     Breast cancer Maternal Aunt      Cancer Maternal Grandfather Alex         lung-     Depression Paternal Grandmother      Diabetes Brother Eliezer     Depression Brother Eliezer         bipolar    Heart disease Brother Eliezer     Ovarian cancer Neg Hx            REVIEW OF SYSTEMS  General: Denies any fever or chills  Chest: Denies shortness of breath, wheezing, coughing, or sputum production  Heart: Denies chest pain, cold extremities, orthopenia, or reduced exercise tolerance  As noted above and per history of  "current illness above, otherwise negative in the remainder of the 14 systems.     PHYSICAL EXAM  Vitals:    02/18/25 1133   Weight: 93.6 kg (206 lb 5.6 oz)   Height: 5' 9" (1.753 m)   PainSc: 0-No pain       General: This patient is well-developed, well-nourished and appears stated age, well-oriented to person, place and time, and cooperative and pleasant on today's visit        LOWER EXTREMITY PHYSICAL EXAM  VASCULAR  Dorsalis pedis and posterior tibial pulses palpable 2/4 bilaterally. Capillary refill time immediate to the toes. Feet are warm to the touch. Skin temperature warm to warm from proximally to distally There are no varicosities, telangiectasias noted to bilateral foot and ankle regions. There are no ecchymoses noted to bilateral foot and ankle regions. There is no gross lower extremity edema.    DERMATOLOGIC  Skin moist with healthy texture and turgor.There are no open ulcerations, lacerations, or fissures to bilateral foot and ankle regions. There are no signs of infection as there is no erythema, no proximal-extending lymphangiitis, no fluctuance, or crepitus noted on palpation to bilateral foot and ankle regions. There is no interdigital maceration.  Thickened hyperkeratotic tissue noted sub left 5th metatarsal head , foot. Post debridement reveals loss of skin lines. There is punctate bleeding with debridement     NEUROLOGIC  Epicritic sensation is intact as the patient is able to sense light touch to bilateral foot and ankle regions. Achilles and patellar deep tendon reflexes intact. Babinski reflex absent    ORTHOPEDIC/BIOMECHANICAL  Mild pain on palpation of above noted lesion especially with side to side compression. Muscle strength AT/EHL/EDL/PT: 5/5; Achilles/Gastroc/Soleus: 5/5; PB/PL: 5/5 Muscle tone is normal. Ankle joint ROM non painful with DF/PF, non-crepitus; STJ ROM  Inversion/Eversion non painful, non crepitus ; MTPJ b/l  DF/PF, non crepitus     ASSESSMENT  1. Plantar verrucae, left " foot    PLAN    1. Patient was educated about clinical and imaging findings, and verbalizes understanding of above.  2. Treatment plan: Risks and benefits of the procedure were discussed with the patient.  Risks include pain, bleeding, scarring, loss of sensation (temporary or permanent), infection, anesthesia reaction, and need for further or repeat procedures.  Benefits of the procedure include resolution of the condition.  All questions were answered and the patient gave verbal consent to proceed.      After risks, benefits, and alternatives discussed, including blistering, pain, scar, recurrence, allergy to anesthesia (if given), hyper- and hypopigmentation, verbal consent from the patient is obtained.  Liquid nitrogen cryosurgery is applied to 1 left foot verruca with prior paring, as detailed in the physical exam, to produce a freeze injury. 2 consecutive freeze thaw cycles are applied to each verruca. The patient is aware that blisters (possibly blood blisters) may form.      3. RTC  for follow up/evaluation in 3 weeks, repeat application as necessary, or sooner if any issues, questions or concerns.    Report Electronically Signed By:     Dulce Arana DPM   Podiatry  [facility]  2/18/2025                 [1]   Current Outpatient Medications on File Prior to Visit   Medication Sig Dispense Refill    almotriptan (AXERT) 12.5 MG tablet Take 1 tablet (12.5 mg total) by mouth 2 (two) times daily as needed for Migraine. may repeat in 2 hours if needed 12 tablet 5    atogepant (QULIPTA) 60 mg Tab Take 1 tablet (60 mg total) by mouth once daily. 30 tablet 5    buPROPion (WELLBUTRIN XL) 300 MG 24 hr tablet Take 1 tablet (300 mg total) by mouth every morning. 90 tablet 3    dextroamphetamine-amphetamine (ADDERALL XR) 20 MG 24 hr capsule Take 1 capsule (20 mg total) by mouth every morning. 30 capsule 0    dextroamphetamine-amphetamine (ADDERALL XR) 20 MG 24 hr capsule Take 1 capsule (20 mg total)  by mouth every morning. 30 capsule 0    dextroamphetamine-amphetamine (ADDERALL XR) 20 MG 24 hr capsule Take 1 capsule (20 mg total) by mouth every morning. 30 capsule 0    promethazine (PHENERGAN) 25 MG tablet Take 1 tablet (25 mg total) by mouth every 6 (six) hours as needed for Nausea. 30 tablet 0    rimegepant (NURTEC) 75 mg odt Take 1 tablet (75 mg total) by mouth daily as needed for Migraine (once per 48 hours). Place ODT tablet on the tongue; alternatively the ODT tablet may be placed under the tongue 8 tablet 12     Current Facility-Administered Medications on File Prior to Visit   Medication Dose Route Frequency Provider Last Rate Last Admin    [START ON 4/1/2025] onabotulinumtoxina injection 200 Units  200 Units Intramuscular 1 time in Clinic/HOD        [2]   Social History  Tobacco Use    Smoking status: Never     Passive exposure: Past    Smokeless tobacco: Never   Substance Use Topics    Alcohol use: Not Currently     Comment: occasional - once a month - two drinks per occasion    Drug use: Never

## 2025-02-19 LAB — VIT B1 BLD-MCNC: 59 UG/L (ref 38–122)
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
## 2025-04-02 ENCOUNTER — HOSPITAL ENCOUNTER (OUTPATIENT)
Dept: RADIOLOGY | Facility: HOSPITAL | Age: 45
Discharge: HOME | End: 2025-04-02
Payer: COMMERCIAL

## 2025-04-02 ENCOUNTER — OFFICE VISIT (OUTPATIENT)
Dept: PAIN MEDICINE | Facility: CLINIC | Age: 45
End: 2025-04-02
Payer: COMMERCIAL

## 2025-04-02 VITALS
RESPIRATION RATE: 17 BRPM | HEART RATE: 87 BPM | SYSTOLIC BLOOD PRESSURE: 117 MMHG | BODY MASS INDEX: 30.66 KG/M2 | HEIGHT: 69 IN | DIASTOLIC BLOOD PRESSURE: 74 MMHG | WEIGHT: 207 LBS

## 2025-04-02 DIAGNOSIS — M54.81 BILATERAL OCCIPITAL NEURALGIA: ICD-10-CM

## 2025-04-02 DIAGNOSIS — G43.E19 INTRACTABLE CHRONIC MIGRAINE WITH AURA AND WITHOUT STATUS MIGRAINOSUS: ICD-10-CM

## 2025-04-02 DIAGNOSIS — M54.2 NECK PAIN: ICD-10-CM

## 2025-04-02 DIAGNOSIS — M47.812 CERVICAL SPONDYLOSIS: Primary | ICD-10-CM

## 2025-04-02 DIAGNOSIS — G43.709 CHRONIC MIGRAINE WITHOUT AURA WITHOUT STATUS MIGRAINOSUS, NOT INTRACTABLE: ICD-10-CM

## 2025-04-02 PROCEDURE — 70553 MRI BRAIN STEM W/O & W/DYE: CPT | Mod: TC,PN

## 2025-04-02 PROCEDURE — A9585 GADOBUTROL INJECTION: HCPCS | Mod: PN

## 2025-04-02 PROCEDURE — 72050 X-RAY EXAM NECK SPINE 4/5VWS: CPT | Mod: 26,,, | Performed by: RADIOLOGY

## 2025-04-02 PROCEDURE — 99999 PR PBB SHADOW E&M-EST. PATIENT-LVL IV: CPT | Mod: PBBFAC,,, | Performed by: ANESTHESIOLOGY

## 2025-04-02 PROCEDURE — 72050 X-RAY EXAM NECK SPINE 4/5VWS: CPT | Mod: TC,PN

## 2025-04-02 PROCEDURE — 25500020 PHARM REV CODE 255: Mod: PN

## 2025-04-02 PROCEDURE — 70553 MRI BRAIN STEM W/O & W/DYE: CPT | Mod: 26,,, | Performed by: RADIOLOGY

## 2025-04-02 RX ORDER — GADOBUTROL 604.72 MG/ML
9 INJECTION INTRAVENOUS
Status: COMPLETED | OUTPATIENT
Start: 2025-04-02 | End: 2025-04-02

## 2025-04-02 RX ORDER — METHYLPREDNISOLONE ACETATE 40 MG/ML
40 INJECTION, SUSPENSION INTRA-ARTICULAR; INTRALESIONAL; INTRAMUSCULAR; SOFT TISSUE
Status: SHIPPED | OUTPATIENT
Start: 2025-04-02

## 2025-04-02 RX ADMIN — GADOBUTROL 9 ML: 604.72 INJECTION INTRAVENOUS at 02:04

## 2025-04-09 ENCOUNTER — PROCEDURE VISIT (OUTPATIENT)
Dept: NEUROLOGY | Facility: CLINIC | Age: 45
End: 2025-04-09
Payer: COMMERCIAL

## 2025-04-09 VITALS
RESPIRATION RATE: 16 BRPM | HEIGHT: 69 IN | BODY MASS INDEX: 30.66 KG/M2 | DIASTOLIC BLOOD PRESSURE: 81 MMHG | OXYGEN SATURATION: 99 % | SYSTOLIC BLOOD PRESSURE: 114 MMHG | HEART RATE: 90 BPM | WEIGHT: 207 LBS

## 2025-04-09 DIAGNOSIS — G43.E19 INTRACTABLE CHRONIC MIGRAINE WITH AURA AND WITHOUT STATUS MIGRAINOSUS: Primary | ICD-10-CM

## 2025-04-09 NOTE — PROCEDURES
Medication: Qulipta  Side Effects: The patient experienced nausea, lethargy, decreased appetite, depression, and hair loss. Due to these adverse effects, the patient self-discontinued the medication approximately 3 weeks ago and will continue to refrain from use.  Botox Injections: The patient last received Botox injections on 01/14/2025.  Occipital Nerve Block: The patient reports receiving an occipital nerve block for pain management on 04/02/2025.    BOTOX  HAS PROVEN VERY EFFECTIVE IN SIGNIFICANTLY REDUCING THE SEVERITY AND FREQUENCY OF MIGRAINE HEADACHES     She reports a significant improvement in her headache frequency and intensity with the current combination of Botox, AXERT, and Nurtec. Specifically, her headache frequency has decreased by more than 50%, from experiencing headaches on 30/30 days to 6/30 days. Additionally, the intensity of her headaches has reduced from a severity of 8/10 to 5/10.     PROCEDURE NAME:      INTRAMUSCULAR BOTOX INJECTIONS         PROCEDURE INDICATION:     INTRACTABLE (PHARMACOLOGICALLY UNRESPONSIVE/RESISTANT) MIGRAINE        PROCEDURE DESCRIPTION:    The procedure was discussed in detail with the patient and the consent form was signed. The patient verbalized understanding of the procedure .    I discussed the risks that may include serious immune reaction and distant spread that could results in loss of breathing. Other side effects may include droopy eyelids, trouble swallowing and cardiac arrhythmias. It was also stressed that it is contraindicated in pregnancy.       PROCEDURE TIME OUT PROCESS:     Time Out was called.     Two patient identifiers were used (first and last name in addition to date of birth). The patient stated the procedure being done (Botox injections) in the scalp muscles (both sides).          PROCEDURE EXECUTION:     As per the standard protocol, a total 155 units were injected in 31 sites.         RT  5 units in 1 site    LT  5  units in 1 site     Procerus 5 units in 1 site     RT Frontalis 10 units in 2 sites     LT Frontalis 10 units in 2 sites     RT Temporalis 20 units in 4 sites    LT Temporalis 20 units in 4 sites    RT Occipitalis 15 units in 3 sites    LT Occipitalis 15 units in 3 sites     RT Cervical Paraspinals 10 units in 2 sites    LT Cervical Paraspinals 10 units in 2 sites    RT Trapezius 15 units in 3 sites    LT Trapezius 15 units in 3 sites       Per the standard of care protocol we use 155 units and waste 45 units. I gave the patient the option of following the standard protocol vs.using the extra 45 units to target areas where the pain is maximum which could potentially provide extra help with headache control. I explained to the patient that this will be an off-label use, not the standard protocol, not evidence-based and could result in more pronounced side effects. The patient verbalized full understanding of all the facts and the risks and benefits and Declined to use the extra 45 units for the following sites:      Procerus 5 units in 1 site     RT Occipitalis 15 units in 3 sites    LT Occipitalis 15  units in 3 sites         PROCEDURE COURSE:       The standard protocol was followed. The procedure was executed very smoothly.         PROCEDURE COMPLICATIONS:     None. The patient tolerated the procedure very well.         PROCEDURE AFTERCARE:     I encouraged the patient to use ice if the sites of injection get tender and call me with any problems or complications.       Will use Apraclonidine eye drops for Botox-induced ptosis.     FOLLOW UP:      RTC in 3 months for a new set of injections and call with any questions and/or concerns.     MEDICAL/SURGICAL COMORBIDITIES      All relevant medical comorbidities noted and managed by primary care physician and medical care team.             HEALTHY LIFESTYLE AND PREVENTATIVE CARE     The patient to adhere to the age-appropriate health maintenance guidelines including  screening tests and vaccinations. The patient to adhere to  healthy lifestyle, optimal weight, exercise, healthy diet, good sleep hygiene and avoiding drugs including smoking, alcohol and recreational drugs.     I spent a total of 38 minutes on the day of the visit.This includes face to face time and non-face to face time preparing to see the patient (eg, review of tests), obtaining and/or reviewing separately obtained history, documenting clinical information in the electronic or other health record, independently interpreting results and communicating results to the patient/family/caregiver, or care coordinator.      Please do not hesitate to contact me with any updates, questions or concerns.     No follow-ups on file.     Jane Adamson, MSN, FNP-C     General Neurology

## 2025-04-15 ENCOUNTER — TELEPHONE (OUTPATIENT)
Dept: PAIN MEDICINE | Facility: CLINIC | Age: 45
End: 2025-04-15
Payer: COMMERCIAL

## 2025-04-16 ENCOUNTER — OFFICE VISIT (OUTPATIENT)
Dept: PAIN MEDICINE | Facility: CLINIC | Age: 45
End: 2025-04-16
Payer: COMMERCIAL

## 2025-04-16 DIAGNOSIS — M47.812 CERVICAL SPONDYLOSIS: ICD-10-CM

## 2025-04-16 DIAGNOSIS — M54.81 BILATERAL OCCIPITAL NEURALGIA: Primary | ICD-10-CM

## 2025-04-16 DIAGNOSIS — G43.709 CHRONIC MIGRAINE WITHOUT AURA WITHOUT STATUS MIGRAINOSUS, NOT INTRACTABLE: ICD-10-CM

## 2025-04-16 PROCEDURE — 98006 SYNCH AUDIO-VIDEO EST MOD 30: CPT | Mod: 95,,, | Performed by: ANESTHESIOLOGY
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
## 2025-04-25 ENCOUNTER — TELEPHONE (OUTPATIENT)
Dept: PAIN MEDICINE | Facility: CLINIC | Age: 45
End: 2025-04-25
Payer: COMMERCIAL

## 2025-04-25 NOTE — TELEPHONE ENCOUNTER
----- Message from Latasha sent at 4/25/2025 12:47 PM CDT -----  Contact: [phone]  [email or web address] Patient is calling for Medical Advice regarding:procedure appt How long has patient had these symptoms:Pharmacy name and phone#:[facility] a call back or [facility]:call back Comments:She states someone was suppose to call her in regards to a procedure from the virtual appt she had but never heard from anyone please advise Please advise patient replies from provider may take up to 48 hours.

## 2025-04-25 NOTE — TELEPHONE ENCOUNTER
I called the patient and got her scheduled for her in office visit with Dr. Borden (occipital nerve block).    Rupali GILLESPIE ([facility])

## 2025-05-06 ENCOUNTER — OFFICE VISIT (OUTPATIENT)
Facility: CLINIC | Age: 45
End: 2025-05-06
Payer: COMMERCIAL

## 2025-05-06 DIAGNOSIS — R11.0 NAUSEA: ICD-10-CM

## 2025-05-06 DIAGNOSIS — F41.9 RECURRENT MILD MAJOR DEPRESSIVE DISORDER WITH ANXIETY: Chronic | ICD-10-CM

## 2025-05-06 DIAGNOSIS — G43.709 CHRONIC MIGRAINE WITHOUT AURA WITHOUT STATUS MIGRAINOSUS, NOT INTRACTABLE: ICD-10-CM

## 2025-05-06 DIAGNOSIS — M54.81 BILATERAL OCCIPITAL NEURALGIA: ICD-10-CM

## 2025-05-06 DIAGNOSIS — F33.0 RECURRENT MILD MAJOR DEPRESSIVE DISORDER WITH ANXIETY: Chronic | ICD-10-CM

## 2025-05-06 DIAGNOSIS — G44.89 CHRONIC MIXED HEADACHE SYNDROME: ICD-10-CM

## 2025-05-06 DIAGNOSIS — G44.229 CHRONIC TENSION-TYPE HEADACHE, NOT INTRACTABLE: ICD-10-CM

## 2025-05-06 DIAGNOSIS — H93.11 OBJECTIVE TINNITUS OF RIGHT EAR: ICD-10-CM

## 2025-05-06 DIAGNOSIS — G43.E19 INTRACTABLE CHRONIC MIGRAINE WITH AURA AND WITHOUT STATUS MIGRAINOSUS: Primary | ICD-10-CM

## 2025-05-06 DIAGNOSIS — M54.2 NECK PAIN: ICD-10-CM

## 2025-05-06 DIAGNOSIS — R42 DIZZINESS AND GIDDINESS: ICD-10-CM

## 2025-05-06 PROCEDURE — 99417 PROLNG OP E/M EACH 15 MIN: CPT | Mod: 95,,,

## 2025-05-06 PROCEDURE — 98007 SYNCH AUDIO-VIDEO EST HI 40: CPT | Mod: 95,,,

## 2025-05-06 RX ORDER — BUPROPION HYDROCHLORIDE 100 MG/1
100 TABLET ORAL DAILY
Qty: 7 TABLET | Refills: 0 | Status: SHIPPED | OUTPATIENT
Start: 2025-05-15 | End: 2025-05-22

## 2025-05-06 RX ORDER — BUPROPION HYDROCHLORIDE 150 MG/1
150 TABLET ORAL EVERY MORNING
Qty: 7 TABLET | Refills: 0 | Status: SHIPPED | OUTPATIENT
Start: 2025-05-07 | End: 2025-05-14

## 2025-05-06 RX ORDER — RIMEGEPANT SULFATE 75 MG/75MG
75 TABLET, ORALLY DISINTEGRATING ORAL EVERY OTHER DAY
Qty: 16 TABLET | Refills: 2 | Status: SHIPPED | OUTPATIENT
Start: 2025-05-06 | End: 2025-08-10

## 2025-05-06 RX ORDER — BUPROPION HYDROCHLORIDE 75 MG/1
75 TABLET ORAL DAILY
Qty: 7 TABLET | Refills: 0 | Status: SHIPPED | OUTPATIENT
Start: 2025-05-23 | End: 2025-05-30

## 2025-05-06 RX ORDER — AMITRIPTYLINE HYDROCHLORIDE 10 MG/1
10 TABLET, FILM COATED ORAL NIGHTLY
Qty: 30 TABLET | Refills: 0 | Status: SHIPPED | OUTPATIENT
Start: 2025-05-31 | End: 2025-06-30

## 2025-05-06 NOTE — PATIENT INSTRUCTIONS
Taper and Discontinue Wellbutrin (bupropion):  Goal: Gradual discontinuation of bupropion to transition to alternate therapy.  Week 1 (05/07/2025 - 05/14/2025):  Medication: bupropion  mg  Sig: Take 1 tablet (150 mg) by mouth every morning for 7 days.    Week 2 (05/15/2025 - 05/22/2025):  Medication: bupropion  mg  Sig: Take 1 tablet (100 mg) by mouth once daily for 7 days.    Week 3 (05/23/2025 - 05/30/2025):  Medication: bupropion IR 75 mg  Sig: Take 1 tablet (75 mg) by mouth once daily for 7 days.    Week 4 (beginning 05/31/2025):  Discontinue bupropion.  Initiate: amitriptyline (Elavil) 10 mg  Sig: Take 1 tablet (10 mg) by mouth every evening.

## 2025-05-06 NOTE — PROGRESS NOTES
"Subjective:       Patient ID: Alicia Teixeira is a 44 y.o. female.      Chief Complaint: "Headaches"      HPI    HPI 44 y.o.  Years old female   with PMHx of  has a past medical history of Allergy, Generalized headaches, Herpes zoster without complication, History of concussion, History of kidney stones, Migraine headache, and Vitamin D deficiency.  and other medical conditions came for the follow-up evaluation and recommendation of "Headaches".    Visit type: audiovisual      The originating site (patient location) is: Home.      The distant site (neurologist location) is: Neurology Clinic at [facility].      The chief complaint leading to consultation is: "Headaches".      Visit type: Virtual visit with synchronous audio and video.      Consent: The patient verbally consented to participating in the video visit and informed that may decline to receive medical services by telemedicine and may withdraw from such care at any time.      I discussed with the patient the nature of our telemedicine visits, that:      I  would evaluate the patient and recommend diagnostics and treatments based on my assessment.    Our sessions are not being recorded and that personal health information is protected.    Our team would provide follow up care in person if/when the patient needs it.    Virtual (video/telemedicine) visits have significant limitations. A telemedicine exam is primarily focused on the history and what I can observe. Several critical parts of the neurological exam cannot be performed.         Interval History: (02/13/2025) - Ordered X-Ray Cervical Spine Complete 5 view  / Brain MRI W WO Contrast / PT & Pain Management Referrals / Continued Botox, Qulipta, AXERT, and Nurtec / Started Phenergan       -Patient previously established with [facility] in [place] with Dr. Chicas for management of Bilateral Occipital Neuralgia and Chronic Migraines, last seen 04/2024. She presents to establish care " closer to home in the [facility].     The patient is accompanied by self.    Headache History:    Started: Migraines started at 7 years old / worsened in 2010 post meningitis / Currently she reports they are controlled on current regimen.  Describes:   Migraines are are aching and throbbing-like, building up slowly. Headaches do occasionally wake them up from sleep. They interfere with daily activities.  Occipital Neuralgia Headaches are burning and shooting and associated with scalp tenderness bilaterally (worse to the right side)  Timing:   Duration of 1 week for Occipital Neuralgia  Duration of 3 days for Migraines   Duration of 2 hours for Tension Type Headaches  Frequency:  5 Occipital Neuralgia Headaches per month  6 Migraines per month  1 tension type Headache per month    Pain: Increasing in severity, rated 5-8/10, causing significant morbidity.  Location:   Occipital Neuralgia: starts to bilateral neck and extends up to bilateral parietal areas  Migraine: Generalized lobes, occasionally affecting right or left side   Tension type: band-like pattern around generalized lobes     Medications: Botox / Qulipta / Nurtec / AXERT  Worsen: Pain worsened with physical activity, lack of sleep, weather changes (bad or rainy weather), Stress  Alleviated: Dark and quiet room, cold towels to the forehead.  Associated symptoms: Dizziness  / photophobia / phonophobia  / Nausea / Impaired concentration / Neck pain - non radiating / scalp sensitivity / ear ringing (right ear)     Pertinent Negative Symptoms: No associated neurological deficits, vomiting, neck pain with radiation, balance issues, acute thunderclap onset, double or blurry vision, focal or bilateral limb weakness, or extremity numbness and tingling.    Triggers: Menstrual cycle and ovulation / Seafood / Red Wine / Bad Weather / Flashing lights / Loud noises (at music concert) / Heat  Positional/Behavioral Factors: Headaches are positional and postural,  worsened by movement and bending the head downward. Denies worsening with Valsalva maneuver.  Prodrome symptoms: Does report visual aura of split vision, she is unsure if it is bilateral or unilateral, goes away when migraine starts        Family History: Notable for migraines in son; no family history of early dementia.      Exclusions: No TMJ issues, seizures, smoking history, caffeine overuse, vertigo, blackouts, fever, chills, or significant memory loss. No history of strokes or falls.    Head Trauma History: Patient reports a history of head trauma sustained in high school, when they were kicked in the head by a horse. Additionally, the patient has a past medical history (PMH) of meningitis, diagnosed in 2010, which required hospitalization.    Ophthalmological History: Last eye clinic appointment 1 year ago, with normal pressures, no papilledema, and no abnormalities reported per patient. PMH of right lazy eye and routine ophthalmology visits every 12 months.    Sleep History: Sleep study done - no SHEYLA found / No symptoms of sleep apnea (e.g., snoring, gasping, frequent nighttime awakening, daytime fatigue).     Current Treatment Regimen:  The patient is currently prescribed Qulipta 60 mg PO daily, which was initiated approximately three weeks ago. She reports experiencing fatigue and nausea, which has led to her adjusting the dosing to QHS (at bedtime) rather than daily. She notes that this adjustment has helped alleviate the side effects.  Additionally, the patient receives Botox injections every three months for migraine management, a treatment she has been undergoing since 2012.    For abortive therapy, the patient is prescribed Nurtec 75 mg PO daily PRN, which she finds effective for managing migraines associated with her menstrual cycle. She also reports that AXERT is effective in aborting migraines triggered by adverse weather conditions or stress.  The patient denies any association between Adderall  "use and her migraines. She does not take Adderall daily, choosing instead to take breaks on weekends, holidays, and vacations.    The patient also describes a "wearing off effect" prior to her subsequent Botox injections, typically occurring at week 11. Given this information, it is medically necessary for the patient to continue Botox therapy, as she has maintained a greater than 50% reduction in the frequency of migraines with ongoing Botox treatments. The patient wishes to proceed with further injections.    Botox Treatment:  The patient last received Botox injections on 01/14/2025. She reports a significant improvement in her headache frequency and intensity with the current combination of Botox, Qulipta, AXERT, and Nurtec. Specifically, her headache frequency has decreased by more than 50%, from experiencing headaches on 30/30 days to 6/30 days. Additionally, the intensity of her headaches has reduced from a severity of 8/10 to 5/10.     Pain Management Plan:  The patient has a history of uncontrolled occipital neuralgia (ON), which can exacerbate her migraines. The most recent nerve block was administered on 05/30/2024, followed by cryoneurolysis. A total of 4 nerves at 4 sites were treated using 12 treatment cycles. The patient reports significant improvement post-treatment.  The patient also has a history of nerve ablation performed twice, though she notes that the pain typically returns after about one year.  She is requesting to re-establish care with a local pain management provider to obtain another occipital nerve block for ongoing pain relief.    Regarding antiemetic therapy, the patient reports that Zofran is not effective for her, but Promethazine has been beneficial in controlling nausea.            New Issues: (05/06/2025) -     No new issues reported per patient. Patient reports Headaches have significantly worsened over the last month. She reports they are severe debilitating, and effecting daily " functioning.     Medication History and Side Effects  Botox Injections: Last administered on 04/09/2025. Patient reports no significant relief.  Occipital Nerve Block: Patient experienced approximately 5 days of relief. Considering radiofrequency ablation for further management.    Abortive Therapies (Current/Previously Trialed):  Nurtec: Provides partial relief, notably more effective for hormonally-triggered migraines. Decreases headache intensity but does not achieve full resolution.  Axert: More effective for occipital neuralgia-associated headaches. Currently taking second dose per episode. Relief is temporary (approx. 12 hours). Patient reports increasing frequency of use.  Sprix: Previously effective with no systemic side effects; now reports nasal burning. Requests refill.  Maxalt, Migranal: Ineffective.  Imitrex: Reported allergy. Chart notes nausea; patient denies knowledge of specific reaction but agrees to avoid.    Preventive Therapies (Current/Previously Trialed):  Qulipta: Patient self-discontinued due to intolerable side effects, including nausea, lethargy, decreased appetite, depressive symptoms, and alopecia. She does not intend to resume therapy.  Gabapentin, Lyrica: Effective but poorly tolerated due to sedation and dizziness.  Topamax: Discontinued due to history of nephrolithiasis.  Emgality: Discontinued ~3 months ago. Patient attributes 60-lb weight gain during use, with 30-lb weight loss since cessation. Cardiologist confirmed weight gain as known side effect.  Ajovy, Aimovig: Previously trialed without success.  Propranolol (Inderal LA) 60 mg: Previously trialed.  Antidepressants: Effexor and Lexapro previously trialed.    Current Medications  Nurtec  Axert  Phenergan (recently initiated)  Botox    Headache Profile  Migraine: Daily frequency, lasting up to 2 days. Severity ranges 2-10/10. Associated symptoms: nausea, dizziness, photophobia, phonophobia, impaired concentration, non-radiating  neck pain, scalp tenderness, and right-sided tinnitus. No ER visits.  Occipital Neuralgia: Headaches begin as occipital neuralgia and often progress to migraine.  Tension-Type Headaches: Also reported.    Pertinent Negative Symptoms: No focal neurological deficits, vomiting, radicular neck pain, balance disturbances, thunderclap onset, diplopia, visual blurring, limb weakness, or paresthesia.    Other Notes:  Physical Therapy: Referral placed; pending initiation.  Pain Management: Referral placed.  Adderall: No correlation with headache frequency; patient is currently on a break from use.  Pregnancy/Birth Control: Not applicable.  has a history of vasectomy.  .    Review of Systems   Constitutional:  Negative for activity change, appetite change, chills, diaphoresis, fatigue, fever and unexpected weight change.   HENT:  Positive for tinnitus. Negative for congestion, dental problem, drooling, ear discharge, ear pain, facial swelling, hearing loss, mouth sores, nosebleeds, postnasal drip, rhinorrhea, sinus pressure, sinus pain, sneezing, sore throat, trouble swallowing and voice change.    Eyes:  Positive for photophobia and visual disturbance. Negative for pain, discharge, redness and itching.   Respiratory:  Negative for cough, chest tightness, shortness of breath and wheezing.    Cardiovascular:  Negative for chest pain, palpitations and leg swelling.   Gastrointestinal:  Positive for nausea. Negative for abdominal distention, abdominal pain, blood in stool, constipation, diarrhea and vomiting.   Endocrine: Negative for cold intolerance, heat intolerance, polydipsia, polyphagia and polyuria.   Genitourinary:  Negative for decreased urine volume, difficulty urinating, dysuria, flank pain, frequency, hematuria, pelvic pain, urgency and vaginal discharge.   Musculoskeletal:  Positive for neck pain. Negative for arthralgias, back pain, gait problem, joint swelling, myalgias and neck stiffness.   Skin:  Negative  for color change and rash.   Allergic/Immunologic: Negative for immunocompromised state.   Neurological:  Positive for dizziness and headaches. Negative for tremors, seizures, syncope, facial asymmetry, speech difficulty, weakness, light-headedness and numbness.        Impaired concentration / scalp sensitivity   Hematological:  Negative for adenopathy. Does not bruise/bleed easily.   Psychiatric/Behavioral:  Negative for agitation, behavioral problems, confusion (ea), decreased concentration, dysphoric mood, hallucinations, self-injury, sleep disturbance and suicidal ideas. The patient is not nervous/anxious and is not hyperactive.    All other systems reviewed and are negative.                Current Outpatient Medications:     almotriptan (AXERT) 12.5 MG tablet, Take 1 tablet (12.5 mg total) by mouth 2 (two) times daily as needed for Migraine. may repeat in 2 hours if needed, Disp: 12 tablet, Rfl: 5    buPROPion (WELLBUTRIN XL) 300 MG 24 hr tablet, Take 1 tablet (300 mg total) by mouth every morning., Disp: 90 tablet, Rfl: 3    dextroamphetamine-amphetamine (ADDERALL XR) 20 MG 24 hr capsule, Take 1 capsule (20 mg total) by mouth every morning., Disp: 30 capsule, Rfl: 0    dextroamphetamine-amphetamine (ADDERALL XR) 20 MG 24 hr capsule, Take 1 capsule (20 mg total) by mouth every morning., Disp: 30 capsule, Rfl: 0    dextroamphetamine-amphetamine (ADDERALL XR) 20 MG 24 hr capsule, Take 1 capsule (20 mg total) by mouth every morning., Disp: 30 capsule, Rfl: 0    rimegepant (NURTEC) 75 mg odt, Take 1 tablet (75 mg total) by mouth daily as needed for Migraine (once per 48 hours). Place ODT tablet on the tongue; alternatively the ODT tablet may be placed under the tongue, Disp: 8 tablet, Rfl: 12    Current Facility-Administered Medications:     methylPREDNISolone acetate injection 40 mg, 40 mg, Intramuscular, 1 time in Clinic/HOD,     methylPREDNISolone acetate injection 40 mg, 40 mg, Intramuscular, 1 time in  Clinic/HOD,     [START ON 7/9/2025] onabotulinumtoxina injection 200 Units, 200 Units, Intramuscular, Q90 Days,     Past Medical History:   Diagnosis Date    Allergy     Generalized headaches     Herpes zoster without complication 04/11/2022    diagnosed by [facility]    History of concussion 11/16/2017 5/2012, hit head after passing out, +LOC    History of kidney stones 2022    Migraine headache     followed by [facility]    Vitamin D deficiency        Past Surgical History:   Procedure Laterality Date    ARTHROSCOPIC REPAIR OF ROTATOR CUFF OF SHOULDER  10/12/2021    Dr. Chaves    BREAST SURGERY      excess tissue outer breast/axillary area removed - right    INJECTION OF ANESTHETIC AGENT AROUND NERVE Bilateral 04/16/2019    Procedure: BLOCK, NERVE, GREATER AND LESSER OCCIPITAL Need Consent;  Surgeon: Isabelle River MD;  Location: [facility], [place], operated by [facility];  Service: Pain Management;  Laterality: Bilateral;    INJECTION OF ANESTHETIC AGENT AROUND NERVE Right 09/11/2021    Procedure: BLOCK, NERVE GREATER AND LESSER OCCIPITAL;  Surgeon: Isabelle River MD;  Location: [facility], [place], operated by [facility];  Service: Pain Management;  Laterality: Right;    RADIOFREQUENCY ABLATION Bilateral 04/08/2022    Procedure: B/L Greater and Lesser Occipital Nerve RFA;  Surgeon: Manuel Garner DO;  Location: [facility] OR;  Service: Pain Management;  Laterality: Bilateral;       Social History     Socioeconomic History    Marital status:    Occupational History    Occupation: computer - IT   Tobacco Use    Smoking status: Never     Passive exposure: Past    Smokeless tobacco: Never   Substance and Sexual Activity    Alcohol use: Not Currently     Comment: occasional - once a month - two drinks per occasion    Drug use: Never    Sexual activity: Yes     Partners: Male     Birth control/protection: None     Social Drivers of Health     Financial Resource Strain: Low Risk  (1/31/2024)    Overall Financial Resource Strain (CARDIA)      Difficulty of Paying Living Expenses: Not hard at all   Food Insecurity: No Food Insecurity (1/31/2024)    Hunger Vital Sign     Worried About Running Out of Food in the Last Year: Never true     Ran Out of Food in the Last Year: Never true   Transportation Needs: No Transportation Needs (1/31/2024)    PRAPARE - Transportation     Lack of Transportation (Medical): No     Lack of Transportation (Non-Medical): No   Physical Activity: Insufficiently Active (1/31/2024)    Exercise Vital Sign     Days of Exercise per Week: 4 days     Minutes of Exercise per Session: 30 min   Stress: Stress Concern Present (1/31/2024)    [employer] [employer] - Occupational Stress Questionnaire     Feeling of Stress : To some extent   Housing Stability: Low Risk  (1/31/2024)    Housing Stability Vital Sign     Unable to Pay for Housing in the Last Year: No     Number of Places Lived in the Last Year: 1     Unstable Housing in the Last Year: No         Past/Current Medical/Surgical History, Past/Current Social History, Past/Current Family History and Past/Current Medications were reviewed in detail.    Objective:     GENERAL APPEARANCE:     The patient looks comfortable.    No signs of respiratory distress.    Normal breathing pattern.    No dysmorphic features    Normal eye contact.     GENERAL MEDICAL EXAM:    HEENT:  Head is atraumatic normocephalic.      Neck and Axillae: No JVD. No visible lesions.    Cardiopulmonary: No cyanosis. No tachypnea. Normal respiratory effort.    Gastrointestinal/Urogenital:  No jaundice. No stomas or lesions. No visible hernias. No catheters.     Skin, Hair and Nails: No pathognonomic skin rash. No neurofibromatosis. No visible lesions.No stigmata of autoimmune disease. No clubbing.    Limbs: No varicose veins. No visible swelling.    Muskoskeletal: No visible deformities.No visible lesions.               Neurological Exam  Mental Status  Awake, alert and oriented to person, place  and time. Oriented to person, place and time. Recent and remote memory are intact. Speech is normal. Language is fluent with no aphasia. Attention and concentration are normal. Fund of knowledge is appropriate for level of education. Apraxia absent.    Cranial Nerves  CN I: Sense of smell is normal.  CN II: Visual acuity is normal. Visual fields full to confrontation.  CN III, IV, VI: Extraocular movements intact bilaterally. Normal lids and orbits bilaterally.  CN V: Facial sensation is normal.  CN VII: Full and symmetric facial movement.  CN VIII: Hearing is normal.  CN IX, X: Palate elevates symmetrically  CN XI: Shoulder shrug strength is normal.  CN XII: Tongue midline without atrophy or fasciculations.    Motor  Normal muscle bulk throughout. No fasciculations present. Normal muscle tone. No abnormal involuntary movements.    Sensory  Light touch is normal in upper and lower extremities. Temperature is normal in upper and lower extremities.     Coordination    Finger-to-nose, rapid alternating movements and heel-to-shin normal bilaterally without dysmetria.    Gait  Normal casual, toe, heel and tandem gait.        Lab Results   Component Value Date    WBC 6.94 06/21/2024    HGB 12.1 06/21/2024    HCT 38.0 06/21/2024    MCV 82 06/21/2024     06/21/2024       Sodium   Date Value Ref Range Status   06/21/2024 141 136 - 145 mmol/L Final     Potassium   Date Value Ref Range Status   06/21/2024 4.4 3.5 - 5.1 mmol/L Final     Chloride   Date Value Ref Range Status   06/21/2024 109 95 - 110 mmol/L Final     CO2   Date Value Ref Range Status   06/21/2024 22 (L) 23 - 29 mmol/L Final     Glucose   Date Value Ref Range Status   06/21/2024 89 70 - 110 mg/dL Final     BUN   Date Value Ref Range Status   06/21/2024 12 6 - 20 mg/dL Final     Creatinine   Date Value Ref Range Status   02/13/2025 0.9 0.5 - 1.4 mg/dL Final     Calcium   Date Value Ref Range Status   06/21/2024 9.2 8.7 - 10.5 mg/dL Final     Total Protein  "  Date Value Ref Range Status   06/21/2024 6.9 6.0 - 8.4 g/dL Final     Albumin   Date Value Ref Range Status   06/21/2024 3.4 (L) 3.5 - 5.2 g/dL Final     Total Bilirubin   Date Value Ref Range Status   06/21/2024 0.4 0.1 - 1.0 mg/dL Final     Comment:     For infants and newborns, interpretation of results should be based  on gestational age, weight and in agreement with clinical  observations.    Premature Infant recommended reference ranges:  Up to 24 hours.............<8.0 mg/dL  Up to 48 hours............<12.0 mg/dL  3-5 days..................<15.0 mg/dL  6-29 days.................<15.0 mg/dL       Alkaline Phosphatase   Date Value Ref Range Status   06/21/2024 103 55 - 135 U/L Final     AST   Date Value Ref Range Status   06/21/2024 12 10 - 40 U/L Final     ALT   Date Value Ref Range Status   06/21/2024 11 10 - 44 U/L Final     Anion Gap   Date Value Ref Range Status   06/21/2024 10 8 - 16 mmol/L Final     eGFR if    Date Value Ref Range Status   02/03/2022 >60 >60 mL/min/1.73 m^2 Final     eGFR if non    Date Value Ref Range Status   02/03/2022 >60 >60 mL/min/1.73 m^2 Final     Comment:     Calculation used to obtain the estimated glomerular filtration  rate (eGFR) is the CKD-EPI equation.          Lab Results   Component Value Date    [id number] 02/13/2025       Lab Results   Component Value Date    TSH 2.408 06/21/2024       No results found in the last 24 hours.    No results found in the last 24 hours.    Reviewed the neuroimaging independently       Assessment:   44 y.o. Years old female  with PMH as above came for an evaluation of "Headaches and establish care for Botox"    1. Intractable chronic migraine with aura and without status migrainosus  amitriptyline (ELAVIL) 10 MG tablet    rimegepant (NURTEC) 75 mg odt      2. Chronic mixed headache syndrome        3. Bilateral occipital neuralgia  amitriptyline (ELAVIL) 10 MG tablet      4. Chronic tension-type headache, " not intractable        5. Dizziness and giddiness        6. Nausea        7. Neck pain        8. Objective tinnitus of right ear        9. Recurrent mild major depressive disorder with anxiety  buPROPion (WELLBUTRIN XL) 150 MG TB24 tablet    buPROPion (WELLBUTRIN) 100 MG tablet    buPROPion (WELLBUTRIN) 75 MG tablet    amitriptyline (ELAVIL) 10 MG tablet      10. Chronic migraine without aura without status migrainosus, not intractable  rimegepant (NURTEC) 75 mg odt           Plan:   Patient Neurological Assessment is non-focal.     HEADACHE PLAN    Medication Plan Update - May 2025    Taper and Discontinue Wellbutrin (bupropion):  Goal: Gradual discontinuation of bupropion to transition to alternate therapy.  Week 1 (05/07/2025 - 05/14/2025):  Medication: bupropion  mg  Sig: Take 1 tablet (150 mg) by mouth every morning for 7 days.    Week 2 (05/15/2025 - 05/22/2025):  Medication: bupropion  mg  Sig: Take 1 tablet (100 mg) by mouth once daily for 7 days.    Week 3 (05/23/2025 - 05/30/2025):  Medication: bupropion IR 75 mg  Sig: Take 1 tablet (75 mg) by mouth once daily for 7 days.    Week 4 (beginning 05/31/2025):  Discontinue bupropion.  Initiate: amitriptyline (Elavil) 10 mg  Sig: Take 1 tablet (10 mg) by mouth every evening.    Migraine Management:  Change in Nurtec regimen: Adjusted from 75 mg PO daily to 75 mg PO every other day for migraine prevention.  Continue Botox therapy: 200 units administered every 3 months.  Previous doses: 01/14/2025, 04/09/2025  Next scheduled injection: 07/09/2025  Continue AXERT (almotriptan) 12.5 mg  Sig: Take 1 tablet (12.5 mg) by mouth, may repeat in 2 hours if needed. Maximum: 2 tablets/day. PRN for acute migraine.    Antiemetic Management:  Patient reports ondansetron (Zofran) is ineffective for nausea.  Promethazine has been effective and will be continued as currently prescribed.  No refill requested at this time.    Pain Management:  Continue referral for  Occipital Neuralgia and chronic neck pain management.  Patient declined oral medication options at this time.    -All medication indications and potential side effects were discussed in detail with the patient. Informational pamphlets were provided for further reference. The patient verbally confirmed full understanding of the medications and their instructions.    -Patient instructed to keep a headache diary for review at next follow-up visit.     -Reviewed results of Brain MRI W WO Contrast - Patient verbalized understanding.     -Continue PT Referral for Headache Prevention Therapy / Neck therapy / Vestibular Therapy.     -Continue routine ophthalmology evaluation.     1. Intractable chronic migraine with aura and without status migrainosus  - amitriptyline (ELAVIL) 10 MG tablet; Take 1 tablet (10 mg total) by mouth every evening.  Dispense: 30 tablet; Refill: 0  - rimegepant (NURTEC) 75 mg odt; Take 1 tablet (75 mg total) by mouth every other day. Place ODT tablet on the tongue; alternatively the ODT tablet may be placed under the tongue  Dispense: 16 tablet; Refill: 2    2. Chronic mixed headache syndrome    3. Bilateral occipital neuralgia  - amitriptyline (ELAVIL) 10 MG tablet; Take 1 tablet (10 mg total) by mouth every evening.  Dispense: 30 tablet; Refill: 0    4. Chronic tension-type headache, not intractable    5. Dizziness and giddiness    6. Nausea    7. Neck pain    8. Objective tinnitus of right ear    9. Recurrent mild major depressive disorder with anxiety  - buPROPion (WELLBUTRIN XL) 150 MG TB24 tablet; Take 1 tablet (150 mg total) by mouth every morning. for 7 days  Dispense: 7 tablet; Refill: 0  - buPROPion (WELLBUTRIN) 100 MG tablet; Take 1 tablet (100 mg total) by mouth once daily. for 7 days  Dispense: 7 tablet; Refill: 0  - buPROPion (WELLBUTRIN) 75 MG tablet; Take 1 tablet (75 mg total) by mouth once daily. for 7 days  Dispense: 7 tablet; Refill: 0  - amitriptyline (ELAVIL) 10 MG tablet;  Take 1 tablet (10 mg total) by mouth every evening.  Dispense: 30 tablet; Refill: 0    10. Chronic migraine without aura without status migrainosus, not intractable  - rimegepant (NURTEC) 75 mg odt; Take 1 tablet (75 mg total) by mouth every other day. Place ODT tablet on the tongue; alternatively the ODT tablet may be placed under the tongue  Dispense: 16 tablet; Refill: 2        LABORATORY EVALUATION     Labs: ([id number]) Serum Creatinine / HCG / Vitamin D / RPR / HIV / KARRIE / CRP / Sed Rate / B1 / B12 / Folate / Homocysteine / A1C / TSH / Lipid Panel / CBC / CMP /  -personally reviewed -non-significant abnormalities       RADIOLOGY EVALUATION     Personally Reviewed Brain MRI W WO - done 04/2025 - no acute abnormalities    Personally Reviewed X-Ray Cervical Spine Complete 5 view - done 04/2025 - no acute abnormalities    Personally Reviewed Home Sleep Study  - done 12/2024 - report only - Clinically significant sleep disordered breathing is not identified     Personally Reviewed  Brain MRI WO - done 2019 - no acute abnormalities    Personally Reviewed X-Ray Cervical Spine 5 View W Flex Extxt  - done 2019 - no acute abnormalities    Personally Reviewed  Brain MRI W WO - done 2011 - report only - no acute abnormalities / Mild scattered pneumocephalus, noting patient had recent LP procedure.           NEUROPHYSIOLOGY EVALUATION       PATHOLOGY EVALUATION        NEUROCOGNITIVE AND NEUROPSYCHOLOGY EVALUATION                  MIGRAINE, COMMON, WITH AURA, EPISODIC, HIGH FREQUENCY       MANAGEMENT       HEADACHE DIARY     DISCUSSED THE THREE-FOLD MANAGEMENT OF MIGRAINE:      LIFESTYLE CHANGES:       Good sleep hygiene  Avoid general triggers like lack of sleep/too much sleep, prolonged sun exposure, excessive screen time and specific triggers based on you own diary   Minimize physical and emotional stress  Smoking avoidance and cessation  Limit caffeine drinks to 1-2 a day   Good hydration   Small frequent meals and avoid  skipping meals   Moderate 30-minute-long aerobic exercises 3 times/week. Avoid strenuous exercise         ABORTIVE MEDICATIONS (ACUTE-RESCUE MEDICATIONS):     Should only be taken 2-3 times/week to avoid rebound and overuse headaches.    I-explained to the patient that pain meds especially triptans should NOT be taken daily to avoid Rebound Headache and Overuse Headache.    Take at the ONSET of the headache sumatriptan 100 mg PO  (or other Triptan) in combination with naproxen 500 mg PO or Ibuprofen 800 mg PO for headache without nausea or vomiting.  This regimen can be repeated only once in 24 hours after 2 hours.    Side effects of triptans were discussed and include rare cardiac and cerebral ischemia and cannot be used with migraine associate with focal neurological deficits (complicated migraine) in addition to drowsiness and potential impairment of driving ability. The patient verbalized understanding.    NSAIDs can cause peptic ulcers, renal insufficiency and may increase the risk of cardiovascular diseases.  SEs were discussed with the patient. The patient verbalized understanding.    Triptans have shown to be more effective than Gepatns with more SE/AE.      AVOID NARCOTICS (OPIATES)      1. No randomized controlled study shows pain-free results with opioids in the treatment of migraine.     2. The physiologic consequences of opioid use are adverse, occur quickly, and can be permanent. Decreased gray matter, release of calcitonin gene-related peptide, dynorphin, and pro-inflammatory peptides, and activation of excitatory glutamate receptors are all associated with opioid exposure.     3. Opioids are pro-nociceptive, prevent reversal of migraine central sensitization, and interfere with triptan effectiveness.     4.Opioids precipitate bad clinical outcomes, especially transformation to daily headache.     5. They cause disease progression, comorbidity, and excessive health care consumption.           NEXT  OPTIONS:    Triptans: Sumatriptan (Imitrex), Rizatriptan (Maxalt).    Gepants: Nuretc (rimegepant)75 mg >Ubrelvy (ubrogepant) 100 mg    Ditans: Reyvow (lasmiditan) 100 mg (No driving due to sedation)    Fioricet without codeine with Reglan.      Prednisone with Reglan.      LAST RESORT:     DHE NS Trudhesa (Max 2 a week)     C/I: concomitant use of vasoconstrictors like Triptans, strong CY inhibitors such as HAART PIs (eg, ritonavir, nelfinavir, or indinavir) and Macrolides (eg, erythromycin or clarithromycin), CAD, PVD, Stroke/TIA and Uncontrolled HTN.  Serious SEs include Vasospasm and Fibrosis (chronic use).       IMPENDING STATUS: Prednisone and Vistaril.    STATUS MIGRAINOSUS: ED-Infusion for Status Protocol.        PREVENTATIVE (MORE ACCURATELY MIGRAINE REDUCTION) MEDICATIONS:           Since the patient's headache is very frequent a lengthy discussion about preventative medications was carried out.The patient understands that prevention means DECREASING frequency and severity and NOT elimination.The patient was made aware that any new medication can cause serious allergic reaction.The medication is considered failure only if a therapeutic dose reached and maintained for 6-8 weeks.        HELPFUL SUPPLEMENTS:     Helpful supplements include Co-Q 10, B2, Mg, Feverfew (Dolovent combination) and butterbur (Petadolex)        NEUROPHARMACOLOGY     NEXT OPTIONS:       Zonisamide/Zonegran (ZNS) 100-400 mg QHS is a good alternative to TPM in case of SE/AE.     Amitriptyline/Elavil (TCA) slow titration to 100-Age which can cause sleepiness, dry eyes, dry mouth, urinary retention, and rarely cardiac arrhythmias    Lamotrigine/Lamictal  (LTG)slow titration to 100 mg BID which can cause serious skin rash and rare cardiac arrhythmias. LTG is superior to other therapies for specifically reducing migraine aura.     Botox 200 units every 3 months.         LAST RESORT OPTIONS:      Namenda 10 mg BID     Valproic acid/  Depakote         NEUROMODULATION     Cefaly, Relivion, Nerivio and GammaCore (VNS)               WOMEN IN CHILD BEARING PERIOD     All migraine medications are not safe during pregnancy and the patient was made aware of this fact. Any pregnancy should be planned, and medications should be stopped PRIOR to pregnancy planning. Folic acid 1 mg daily was recommended. However, hormonal birth control complicates the management of migraine and can exacerbate migraine. If possible, mechanical contraception should be a better option.         PREGNANCY ISSUES         We had a lengthy discussion about managing migraine in patients who are trying to get pregnant or pregnant.    First, I recommend FA 1 mg QD     PRN medications are not safe. The safest option is Reglan PRN and not to be taken more than 2-3 times/week. Fioricet PRN is an option.     Traditional preventative options are not safe including Botox. Cefaly and Relivion are considered safe, but insurance does not cover it.        SCHOOL AND WORK ACCOMMODATIONS        Allow the patient to wear sunglasses or a cap and switch out fluorescent bulbs.    Allow the patient to arrive 5 minutes later and leave 5 minutes earlier to avoid noisy traffic.    Allow the patient to carry a water bottle and refill as needed.    Allow the patient to snack whenever is needed.    Allow the patient to decrease the computer brightness.    Allow the patient to take breaks as needed and extra time for assignments and deadlines.    Allow the patient to avoid strenuous activity as needed.               OCCIPITAL, CERVICO-OCCIPITAL NEURALGIA            EVALUATION       Evaluate Brain WWO.    C-Spine MRI WWO.      MANAGEMENT       Try Amitriptyline/Elavil. Will titrate very slowly to 75 mg QHS which can cause sleepiness, dry eyes, dry mouth, urinary retention, and rarely cardiac arrhythmias. The patient verbalized understanding of the most common SEs and was encouraged to read the leaflet for  more details.     Try Nuretc 75 mg ODT PRN.    Prednisone 50 mg QD for status cases.      Heating pads PRN    Improve posture.    Avoid heavy lifting or sudden spine movements.         NEXT OPTIONS     Botox injections.      INTERVENTIONAL PAIN MANAGEMENT     Occipital Nerve Block (ONB).    Pulsed Radiofrequency (PRF).    Cryotherapy.    Occipital neuromodulation (neuro stimulator).      SURGICAL TREATMENT    Occipital nerve surgical decompression may benefit selected patients but should be reserved in extreme cases when all other modalities have failed to control the pain.             MEDICAL/SURGICAL COMORBIDITIES     All relevant medical comorbidities noted and managed by primary care physician and medical care team.          HEALTHY LIFESTYLE AND PREVENTATIVE CARE    The patient to adhere to the age-appropriate health maintenance guidelines including screening tests and vaccinations. The patient to adhere to  healthy lifestyle, optimal weight, exercise, healthy diet, good sleep hygiene and avoiding drugs including smoking, alcohol and recreational drugs.    I spent a total of 94 minutes on the day of the visit.This includes face to face time and non-face to face time preparing to see the patient (eg, review of tests), obtaining and/or reviewing separately obtained history, documenting clinical information in the electronic or other health record, independently interpreting results and communicating results to the patient/family/caregiver, or care coordinator.     Please do not hesitate to contact me with any updates, questions or concerns.    No follow-ups on file.    Jane Adamson, MSN, FNP-C    General Neurology

## 2025-05-07 ENCOUNTER — PATIENT MESSAGE (OUTPATIENT)
Facility: CLINIC | Age: 45
End: 2025-05-07
Payer: COMMERCIAL

## 2025-05-12 DIAGNOSIS — F41.9 RECURRENT MILD MAJOR DEPRESSIVE DISORDER WITH ANXIETY: Primary | ICD-10-CM

## 2025-05-12 DIAGNOSIS — F33.0 RECURRENT MILD MAJOR DEPRESSIVE DISORDER WITH ANXIETY: Primary | ICD-10-CM

## 2025-05-12 RX ORDER — BUPROPION HYDROCHLORIDE 300 MG/1
300 TABLET ORAL EVERY MORNING
Qty: 30 TABLET | Refills: 2 | Status: SHIPPED | OUTPATIENT
Start: 2025-05-12 | End: 2025-08-10

## 2025-05-15 ENCOUNTER — PATIENT MESSAGE (OUTPATIENT)
Dept: INTERNAL MEDICINE | Facility: CLINIC | Age: 45
End: 2025-05-15

## 2025-05-15 ENCOUNTER — OFFICE VISIT (OUTPATIENT)
Dept: INTERNAL MEDICINE | Facility: CLINIC | Age: 45
End: 2025-05-15
Payer: COMMERCIAL

## 2025-05-15 VITALS
HEIGHT: 69 IN | WEIGHT: 212.75 LBS | HEART RATE: 61 BPM | OXYGEN SATURATION: 100 % | BODY MASS INDEX: 31.51 KG/M2 | SYSTOLIC BLOOD PRESSURE: 118 MMHG | TEMPERATURE: 97 F | DIASTOLIC BLOOD PRESSURE: 80 MMHG

## 2025-05-15 DIAGNOSIS — F41.9 RECURRENT MILD MAJOR DEPRESSIVE DISORDER WITH ANXIETY: Chronic | ICD-10-CM

## 2025-05-15 DIAGNOSIS — F33.0 RECURRENT MILD MAJOR DEPRESSIVE DISORDER WITH ANXIETY: Chronic | ICD-10-CM

## 2025-05-15 DIAGNOSIS — Z13.31 POSITIVE DEPRESSION SCREENING: ICD-10-CM

## 2025-05-15 DIAGNOSIS — E66.09 CLASS 1 OBESITY DUE TO EXCESS CALORIES WITH SERIOUS COMORBIDITY AND BODY MASS INDEX (BMI) OF 31.0 TO 31.9 IN ADULT: Primary | Chronic | ICD-10-CM

## 2025-05-15 DIAGNOSIS — E66.811 CLASS 1 OBESITY DUE TO EXCESS CALORIES WITH SERIOUS COMORBIDITY AND BODY MASS INDEX (BMI) OF 31.0 TO 31.9 IN ADULT: Primary | Chronic | ICD-10-CM

## 2025-05-15 DIAGNOSIS — Z12.31 BREAST CANCER SCREENING BY MAMMOGRAM: ICD-10-CM

## 2025-05-15 DIAGNOSIS — F90.0 ADHD, PREDOMINANTLY INATTENTIVE TYPE: Chronic | ICD-10-CM

## 2025-05-15 DIAGNOSIS — Z01.419 PAP SMEAR, AS PART OF ROUTINE GYNECOLOGICAL EXAMINATION: ICD-10-CM

## 2025-05-15 PROCEDURE — 99999 PR PBB SHADOW E&M-EST. PATIENT-LVL V: CPT | Mod: PBBFAC,,, | Performed by: FAMILY MEDICINE

## 2025-05-15 PROCEDURE — G2211 COMPLEX E/M VISIT ADD ON: HCPCS | Mod: S$GLB,,, | Performed by: FAMILY MEDICINE

## 2025-05-15 PROCEDURE — 99214 OFFICE O/P EST MOD 30 MIN: CPT | Mod: S$GLB,,, | Performed by: FAMILY MEDICINE

## 2025-05-15 RX ORDER — TIRZEPATIDE 2.5 MG/.5ML
2.5 INJECTION, SOLUTION SUBCUTANEOUS
Qty: 2 ML | Refills: 0 | Status: SHIPPED | OUTPATIENT
Start: 2025-05-15

## 2025-05-15 RX ORDER — DEXTROAMPHETAMINE SACCHARATE, AMPHETAMINE ASPARTATE MONOHYDRATE, DEXTROAMPHETAMINE SULFATE AND AMPHETAMINE SULFATE 5; 5; 5; 5 MG/1; MG/1; MG/1; MG/1
20 CAPSULE, EXTENDED RELEASE ORAL EVERY MORNING
Qty: 30 CAPSULE | Refills: 0 | Status: SHIPPED | OUTPATIENT
Start: 2025-05-15

## 2025-05-15 RX ORDER — DEXTROAMPHETAMINE SACCHARATE, AMPHETAMINE ASPARTATE MONOHYDRATE, DEXTROAMPHETAMINE SULFATE AND AMPHETAMINE SULFATE 5; 5; 5; 5 MG/1; MG/1; MG/1; MG/1
20 CAPSULE, EXTENDED RELEASE ORAL EVERY MORNING
Qty: 30 CAPSULE | Refills: 0 | Status: SHIPPED | OUTPATIENT
Start: 2025-07-10

## 2025-05-15 RX ORDER — DEXTROAMPHETAMINE SACCHARATE, AMPHETAMINE ASPARTATE MONOHYDRATE, DEXTROAMPHETAMINE SULFATE AND AMPHETAMINE SULFATE 5; 5; 5; 5 MG/1; MG/1; MG/1; MG/1
20 CAPSULE, EXTENDED RELEASE ORAL EVERY MORNING
Qty: 30 CAPSULE | Refills: 0 | Status: SHIPPED | OUTPATIENT
Start: 2025-06-12

## 2025-05-15 RX ORDER — TIRZEPATIDE 5 MG/.5ML
5 INJECTION, SOLUTION SUBCUTANEOUS
Qty: 2 ML | Refills: 1 | Status: SHIPPED | OUTPATIENT
Start: 2025-06-08
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
## 2025-05-15 NOTE — PROGRESS NOTES
"OFFICE VISIT 5/15/25  8:20 AM CDT    CHIEF COMPLAINT: Follow-up    Class 1 obesity due to excess calories with serious comorbidity and body mass index (BMI) of 31.0 to 31.9 in adult: She presented for follow-up on weight management. Her BMI as of this encounter is 31.42 kg/m2, with a weight of 96.5 kg (212 lb 11.9 oz) and height of 5' 9". Historical weight readings over the past six months indicate an increase from 93.6 kg on 02/18/25 to 96.5 kg on 05/15/25. During the visit, risks and benefits of treatment options were discussed, and I provided education on BMI calculation and healthy weight ranges. I recommended establishing a home baseline weight and advised increased fiber and hydration while on tirzepatide. Orders placed include tirzepatide (Zepbound) initiation at 2.5 mg/0.5 mL and 5 mg/0.5 mL as weekly injections, ambulatory referral to Nutrition Services, and a follow-up virtual visit in 10-11 weeks to assess efficacy.    Recurrent mild major depressive disorder with anxiety: She reported exacerbation of anxiety due to pain and a mild decline in mood, despite a previously stable response to bupropion (Wellbutrin XL) 300 mg daily. She has had difficulty tolerating multiple antidepressants in the past, including Zoloft, Celexa, and Lexapro. Current management includes continued bupropion with good response, and a pharmacogenomics panel was ordered to guide future medication considerations. Mental health symptoms were reviewed and related to her ongoing pain and headache management.    ADHD, predominantly inattentive type: She takes dextroamphetamine-amphetamine (Adderall XR) 20 mg every morning, commonly Monday through Thursday, occasionally on Friday. She previously stopped Adderall for one month to assess any relationship with headache symptoms and noted no improvement. She reports that current treatment is providing satisfactory relief of attention deficit disorder symptoms and helping compensate for " "deficits and improve level of functioning. She reports that she is doing well on current medicine, and she wants to continue her current treatment.     Breast cancer screening by mammogram: A digital bilateral mammogram with tomosynthesis was ordered for breast cancer screening, to be scheduled on or after 6/15/2025.    Pap smear, as part of routine gynecological examination: An ambulatory referral was placed to Gynecology for her routine Pap smear as part of annual gynecological care.    Follow up in about 10 weeks is instructed for a virtual visit with me to evaluate tirzepatide efficacy. Scheduled appointments relevant to these diagnoses include in-person visits with Duc Borden MD, in Pain Management on 6/16/2025, Jane Adamson NP, in Neurology on 7/9/2025, and a follow-up visit with me on 7/30/2025. A mammogram is to be scheduled on or after 6/15/2025.    1. Class 1 obesity due to excess calories with serious comorbidity and body mass index (BMI) of 31.0 to 31.9 in adult  Overview:  No history of or family history of thyroid cancer or multiple endocrine neoplasia syndrome.     Assessment & Plan:  Wt Readings from Last 6 Encounters:   05/15/25 96.5 kg (212 lb 11.9 oz)   04/09/25 93.9 kg (207 lb 0.2 oz)   04/02/25 93.9 kg (207 lb 0.2 oz)   02/18/25 93.6 kg (206 lb 5.6 oz)   02/13/25 93.6 kg (206 lb 5.6 oz)   01/14/25 95 kg (209 lb 7 oz)     Estimated body mass index is 31.42 kg/m² as calculated from the following:    Height as of this encounter: 5' 9" (1.753 m).    Weight as of this encounter: 96.5 kg (212 lb 11.9 oz).    We discussed risks and benefits of treatment options.     Orders:  -     tirzepatide, weight loss, (ZEPBOUND) 5 mg/0.5 mL PnIj; Inject 5 mg into the skin every 7 days.  Dispense: 2 mL; Refill: 1  -     tirzepatide, weight loss, (ZEPBOUND) 2.5 mg/0.5 mL PnIj; Inject 2.5 mg into the skin every 7 days.  Dispense: 2 mL; Refill: 0  -     Ambulatory referral/consult to Nutrition Services; " Future; Expected date: 05/22/2025    2. Recurrent mild major depressive disorder with anxiety  -     Cancel: PHARMACOGENOMICS PANEL; Future; Expected date: 05/15/2025  -     PHARMACOGENOMICS PANEL    3. Positive depression screening  Comments:  I have reviewed the positive depression score with the patient and found that no additional intervention is needed at this time.    4. ADHD, predominantly inattentive type  -     dextroamphetamine-amphetamine (ADDERALL XR) 20 MG 24 hr capsule; Take 1 capsule (20 mg total) by mouth every morning.  Dispense: 30 capsule; Refill: 0  -     dextroamphetamine-amphetamine (ADDERALL XR) 20 MG 24 hr capsule; Take 1 capsule (20 mg total) by mouth every morning.  Dispense: 30 capsule; Refill: 0  -     dextroamphetamine-amphetamine (ADDERALL XR) 20 MG 24 hr capsule; Take 1 capsule (20 mg total) by mouth every morning.  Dispense: 30 capsule; Refill: 0    5. Breast cancer screening by mammogram  -     Mammo Digital Screening Bilat w/ Igor (XPD); Future; Expected date: 06/15/2025    6. Pap smear, as part of routine gynecological examination  -     Ambulatory referral/consult to Gynecology; Future; Expected date: 05/22/2025         Unless specified otherwise, chronic conditions are represented as and appear to be compensated/controlled and stable.  Today's visit involved the intricate management of episodic problem(s) and the ongoing care for the patient's serious or complex condition(s) listed above, reflecting the inherent complexity of providing longitudinal, comprehensive evaluation and management as the central hub for the patient's primary care services.  [place] Board [employer] Pharm[employer] Controlled Prescription Drug Monitoring data reviewed.    Except as noted herein, ROS is otherwise negative.    Vitals:    05/15/25 0817   BP: 118/80   BP Location: Right arm   Patient Position: Sitting   Pulse: 61   Temp: 97.4 °F (36.3 °C)   TempSrc: Tympanic   SpO2: 100%   Weight: 96.5 kg (212 lb 11.9 oz)  "  Height: 5' 9" (1.753 m)   Physical Exam  Vitals reviewed.   Constitutional:       General: She is not in acute distress.     Appearance: Normal appearance. She is not ill-appearing, toxic-appearing or diaphoretic.   Neck:      Thyroid: No thyroid mass, thyromegaly or thyroid tenderness.   Cardiovascular:      Rate and Rhythm: Normal rate.   Pulmonary:      Effort: Pulmonary effort is normal.   Neurological:      Mental Status: She is alert and oriented to person, place, and time. Mental status is at baseline.   Psychiatric:         Mood and Affect: Mood normal.         Behavior: Behavior normal.         Judgment: Judgment normal.       Documentation entered by me for this encounter may have been done in part using speech-recognition technology. Although I have made an effort to ensure accuracy, "sound like" errors may exist and should be interpreted in context.    Follow up in about 10 weeks (around 7/24/2025) for virtual visit, w/ me.    "

## 2025-05-15 NOTE — ASSESSMENT & PLAN NOTE
"Wt Readings from Last 6 Encounters:   05/15/25 96.5 kg (212 lb 11.9 oz)   04/09/25 93.9 kg (207 lb 0.2 oz)   04/02/25 93.9 kg (207 lb 0.2 oz)   02/18/25 93.6 kg (206 lb 5.6 oz)   02/13/25 93.6 kg (206 lb 5.6 oz)   01/14/25 95 kg (209 lb 7 oz)     Estimated body mass index is 31.42 kg/m² as calculated from the following:    Height as of this encounter: 5' 9" (1.753 m).    Weight as of this encounter: 96.5 kg (212 lb 11.9 oz).    We discussed risks and benefits of treatment options.   "

## 2025-05-16 ENCOUNTER — PATIENT MESSAGE (OUTPATIENT)
Dept: INTERNAL MEDICINE | Facility: CLINIC | Age: 45
End: 2025-05-16
Payer: COMMERCIAL

## 2025-05-19 ENCOUNTER — OFFICE VISIT (OUTPATIENT)
Dept: PAIN MEDICINE | Facility: CLINIC | Age: 45
End: 2025-05-19
Payer: COMMERCIAL

## 2025-05-19 VITALS
BODY MASS INDEX: 31.54 KG/M2 | HEIGHT: 69 IN | RESPIRATION RATE: 17 BRPM | WEIGHT: 212.94 LBS | DIASTOLIC BLOOD PRESSURE: 77 MMHG | SYSTOLIC BLOOD PRESSURE: 116 MMHG | HEART RATE: 79 BPM

## 2025-05-19 DIAGNOSIS — M47.812 CERVICAL SPONDYLOSIS: ICD-10-CM

## 2025-05-19 DIAGNOSIS — G43.709 CHRONIC MIGRAINE WITHOUT AURA WITHOUT STATUS MIGRAINOSUS, NOT INTRACTABLE: ICD-10-CM

## 2025-05-19 DIAGNOSIS — M54.81 BILATERAL OCCIPITAL NEURALGIA: Primary | ICD-10-CM

## 2025-05-19 PROCEDURE — 64405 NJX AA&/STRD GR OCPL NRV: CPT | Mod: 50,S$GLB,, | Performed by: ANESTHESIOLOGY

## 2025-05-19 PROCEDURE — 99999 PR PBB SHADOW E&M-EST. PATIENT-LVL IV: CPT | Mod: PBBFAC,,, | Performed by: ANESTHESIOLOGY

## 2025-05-19 PROCEDURE — 64450 NJX AA&/STRD OTHER PN/BRANCH: CPT | Mod: 50,S$GLB,, | Performed by: ANESTHESIOLOGY

## 2025-05-19 PROCEDURE — 99214 OFFICE O/P EST MOD 30 MIN: CPT | Mod: 25,S$GLB,, | Performed by: ANESTHESIOLOGY

## 2025-05-19 RX ORDER — METHYLPREDNISOLONE ACETATE 40 MG/ML
40 INJECTION, SUSPENSION INTRA-ARTICULAR; INTRALESIONAL; INTRAMUSCULAR; SOFT TISSUE
Status: COMPLETED | OUTPATIENT
Start: 2025-05-19 | End: 2025-05-19

## 2025-05-19 RX ADMIN — METHYLPREDNISOLONE ACETATE 40 MG: 40 INJECTION, SUSPENSION INTRA-ARTICULAR; INTRALESIONAL; INTRAMUSCULAR; SOFT TISSUE at 08:05

## 2025-05-23 LAB
ONEOME COMMENT: NORMAL
ONEOME METHOD: NORMAL

## 2025-05-30 ENCOUNTER — PATIENT MESSAGE (OUTPATIENT)
Facility: CLINIC | Age: 45
End: 2025-05-30
Payer: COMMERCIAL

## 2025-05-30 DIAGNOSIS — G43.E19 INTRACTABLE CHRONIC MIGRAINE WITH AURA AND WITHOUT STATUS MIGRAINOSUS: Primary | ICD-10-CM

## 2025-06-02 ENCOUNTER — PATIENT MESSAGE (OUTPATIENT)
Dept: INTERNAL MEDICINE | Facility: CLINIC | Age: 45
End: 2025-06-02
Payer: COMMERCIAL

## 2025-06-08 ENCOUNTER — OFFICE VISIT (OUTPATIENT)
Dept: URGENT CARE | Facility: CLINIC | Age: 45
End: 2025-06-08
Payer: COMMERCIAL

## 2025-06-08 VITALS
HEART RATE: 85 BPM | RESPIRATION RATE: 18 BRPM | BODY MASS INDEX: 31.1 KG/M2 | TEMPERATURE: 98 F | HEIGHT: 69 IN | WEIGHT: 210 LBS | SYSTOLIC BLOOD PRESSURE: 122 MMHG | DIASTOLIC BLOOD PRESSURE: 68 MMHG | OXYGEN SATURATION: 100 %

## 2025-06-08 DIAGNOSIS — H60.91 OTITIS EXTERNA OF RIGHT EAR, UNSPECIFIED CHRONICITY, UNSPECIFIED TYPE: Primary | ICD-10-CM

## 2025-06-08 DIAGNOSIS — H92.01 OTALGIA OF RIGHT EAR: ICD-10-CM

## 2025-06-08 PROCEDURE — 99213 OFFICE O/P EST LOW 20 MIN: CPT | Mod: S$GLB,,, | Performed by: FAMILY MEDICINE

## 2025-06-08 RX ORDER — CIPROFLOXACIN AND DEXAMETHASONE 3; 1 MG/ML; MG/ML
4 SUSPENSION/ DROPS AURICULAR (OTIC) 2 TIMES DAILY
Qty: 7.5 ML | Refills: 0 | Status: SHIPPED | OUTPATIENT
Start: 2025-06-08 | End: 2025-06-15

## 2025-06-08 RX ORDER — AZITHROMYCIN 500 MG/1
500 TABLET, FILM COATED ORAL DAILY
Qty: 3 TABLET | Refills: 0 | Status: SHIPPED | OUTPATIENT
Start: 2025-06-08

## 2025-06-08 NOTE — PROGRESS NOTES
"Subjective:      Patient ID: Alicia Teixeira is a 44 y.o. female.    Vitals:  height is 5' 9" (1.753 m) and weight is 95.3 kg (210 lb). Her oral temperature is 98.4 °F (36.9 °C). Her blood pressure is 122/68 and her pulse is 85. Her respiration is 18 and oxygen saturation is 100%.     Chief Complaint: Otalgia    45 yo female reports right ear pain x 1 wk. Hurts when touching/moving the ear. Pt reports putting in wax removal ear drops last night and pain became significantly worse. Pt denies known fever at home. Pt has has not been otherwise ill. No recent cough or cold. No draining.    Otalgia   There is pain in the right ear. The current episode started in the past 7 days. The problem has been gradually worsening. There has been no fever. The pain is at a severity of 6/10. The pain is moderate. Associated symptoms include headaches. Pertinent negatives include no ear discharge, hearing loss or rash. Treatments tried: wax removal drops. The treatment provided no relief.       Constitution: Negative.   HENT:  Positive for ear pain. Negative for ear discharge, hearing loss and postnasal drip.    Cardiovascular: Negative.    Eyes: Negative.    Respiratory: Negative.     Gastrointestinal: Negative.    Musculoskeletal:  Negative for trauma.   Skin:  Negative for rash.   Neurological:  Positive for headaches.      Objective:     Physical Exam   Constitutional: She is oriented to person, place, and time. No distress.   HENT:   Head: Normocephalic and atraumatic.   Ears:   Right Ear: External ear normal. There is drainage and swelling (mild swelling right middle to outer canal with mild erythema. No cuts or abrasions seen. tenderness when moving the exterrnal ear. mild erythema on the intact TM.). Tympanic membrane is not perforated.   Left Ear: Tympanic membrane, external ear and ear canal normal. Tympanic membrane is not perforated.   Nose: Nose normal. No sinus tenderness. No epistaxis.   Mouth/Throat: Mucous membranes " are moist. No oropharyngeal exudate or posterior oropharyngeal erythema. Oropharynx is clear.   Eyes: Conjunctivae are normal. Pupils are equal, round, and reactive to light.   Neck: Neck supple.   Cardiovascular: Normal rate, regular rhythm, normal heart sounds and normal pulses.   Pulmonary/Chest: Effort normal and breath sounds normal.   Abdominal: Normal appearance.   Musculoskeletal: Normal range of motion.         General: Normal range of motion.   Lymphadenopathy:     She has cervical adenopathy.        Right cervical: Superficial cervical adenopathy present. No posterior cervical adenopathy present.       Left cervical: No superficial cervical and no posterior cervical adenopathy present.   Neurological: no focal deficit. She is alert and oriented to person, place, and time.   Skin: Skin is warm and no rash.         Comments: Normal turgor   Psychiatric: Her behavior is normal. Mood, judgment and thought content normal.   Nursing note and vitals reviewed.      Assessment:     1. Otitis externa of right ear, unspecified chronicity, unspecified type    2. Otalgia of right ear        Plan:       Otitis externa of right ear, unspecified chronicity, unspecified type    Otalgia of right ear    Other orders  -     azithromycin (ZITHROMAX) 500 MG tablet; Take 1 tablet (500 mg total) by mouth once daily.  Dispense: 3 tablet; Refill: 0  -     ciprofloxacin-dexAMETHasone 0.3-0.1% (CIPRODEX) 0.3-0.1 % DrpS; Place 4 drops into the right ear 2 (two) times daily. for 7 days  Dispense: 7.5 mL; Refill: 0      Review of patient's allergies indicates:   Allergen Reactions    Imitrex [sumatriptan succinate] Nausea Only       SUMMARY:  See hpi  There is a mild OM and OE. No wax or foreign body seen. ?source of this acute infection.  Adding above.  Supportive measures.  Will need further management if continues.  See below.     Patient Instructions   Thank you for allowing our team to take care of you today.  The diagnosis is  outer ear canal infection.  No water in this ear for seven days.  Adding Ciprodex ear drops twice a day for seven days.   Also adding Zithromax antibiotic pill for three days.  Tylenol or Motrin if needed for pain.  Stay hydrated. Proper rest.  Attached is a handout with other general information about this diagnosis.  If any worsening or new symptoms, get an immediate medical provider evaluation.  Followup here as needed.

## 2025-06-08 NOTE — PATIENT INSTRUCTIONS
Thank you for allowing our team to take care of you today.  The diagnosis is outer ear canal infection.  No water in this ear for seven days.  Adding Ciprodex ear drops twice a day for seven days.   Also adding Zithromax antibiotic pill for three days.  Tylenol or Motrin if needed for pain.  Stay hydrated. Proper rest.  Attached is a handout with other general information about this diagnosis.  If any worsening or new symptoms, get an immediate medical provider evaluation.  Followup here as needed.

## 2025-06-09 DIAGNOSIS — E66.09 CLASS 1 OBESITY DUE TO EXCESS CALORIES WITH SERIOUS COMORBIDITY AND BODY MASS INDEX (BMI) OF 31.0 TO 31.9 IN ADULT: Chronic | ICD-10-CM

## 2025-06-09 DIAGNOSIS — E66.811 CLASS 1 OBESITY DUE TO EXCESS CALORIES WITH SERIOUS COMORBIDITY AND BODY MASS INDEX (BMI) OF 31.0 TO 31.9 IN ADULT: Chronic | ICD-10-CM

## 2025-06-09 NOTE — TELEPHONE ENCOUNTER
Care Due:                  Date            Visit Type   Department     Provider  --------------------------------------------------------------------------------                                EP -                              PRIMARY      [facility]  Last Visit: 05-      CARE ([facility])   MEDICINE       Easton Hutchison                              ESTABLISHED                              PATIENT -    [facility]  Next Visit: 07-      [facility]      MEDICINE       Easton Hutchison                                                            Last  Test          Frequency    Reason                     Performed    Due Date  --------------------------------------------------------------------------------    HBA1C.......  6 months...  tirzepatide,.............  06- 12-    [facility] Embedded Care Due Messages. Reference number: [id number].   6/09/2025 9:26:25 AM CDT

## 2025-06-09 NOTE — TELEPHONE ENCOUNTER
Refill Routing Note   Medication(s) are not appropriate for processing by [facility] for the following reason(s):        Required labs outdated    [facility] action(s):  Defer   Requires labs : Yes      Medication Therapy Plan: FOVS      Appointments  past 12m or future 3m with PCP    Date Provider   Last Visit   5/15/2025 SHEILA Hutchison MD   Next Visit   7/30/2025 SHEILA Hutchison MD   ED visits in past 90 days: 0        Note composed:3:56 PM 06/09/2025

## 2025-06-12 ENCOUNTER — PATIENT MESSAGE (OUTPATIENT)
Dept: INTERNAL MEDICINE | Facility: CLINIC | Age: 45
End: 2025-06-12
Payer: COMMERCIAL

## 2025-06-12 RX ORDER — TIRZEPATIDE 2.5 MG/.5ML
2.5 INJECTION, SOLUTION SUBCUTANEOUS
Qty: 2 ML | Refills: 0 | OUTPATIENT
Start: 2025-06-12

## 2025-06-12 RX ORDER — TIRZEPATIDE 5 MG/.5ML
5 INJECTION, SOLUTION SUBCUTANEOUS
Qty: 2 ML | Refills: 1 | OUTPATIENT
Start: 2025-06-12
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
## 2025-06-12 NOTE — TELEPHONE ENCOUNTER
Refill not approved. REASON: Please contact her and clarify if she wants prescriptions for the vials or the pens sent to [facility]. The vials are significantly cheaper, but they require her to draw up her own medicine in a syringe. If she is unsure of which one she wants, recommend that she call [facility] so that they can go over the details and prices. Then she can submit an e-visit for medication management with an explanation of which one she wants, and I will respond to her e-visit request.

## 2025-06-13 DIAGNOSIS — F90.0 ADHD, PREDOMINANTLY INATTENTIVE TYPE: Chronic | ICD-10-CM

## 2025-06-13 NOTE — TELEPHONE ENCOUNTER
Refill Routing Note   Medication(s) are not appropriate for processing by [facility] for the following reason(s):        Outside of protocol    [facility] action(s):  Route               Appointments  past 12m or future 3m with PCP    Date Provider   Last Visit   5/15/2025 SHEILA Hutchison MD   Next Visit   7/30/2025 SHEILA Hutchison MD   ED visits in past 90 days: 0        Note composed:1:04 PM 06/13/2025

## 2025-06-13 NOTE — TELEPHONE ENCOUNTER
No care due was identified.  [facility] Embedded Care Due Messages. Reference number: [id number].   6/13/2025 12:55:11 PM CDT

## 2025-06-16 ENCOUNTER — OFFICE VISIT (OUTPATIENT)
Dept: PAIN MEDICINE | Facility: CLINIC | Age: 45
End: 2025-06-16
Payer: COMMERCIAL

## 2025-06-16 ENCOUNTER — PATIENT MESSAGE (OUTPATIENT)
Facility: CLINIC | Age: 45
End: 2025-06-16
Payer: COMMERCIAL

## 2025-06-16 ENCOUNTER — TELEPHONE (OUTPATIENT)
Dept: PAIN MEDICINE | Facility: CLINIC | Age: 45
End: 2025-06-16

## 2025-06-16 VITALS — HEIGHT: 69 IN | BODY MASS INDEX: 31.01 KG/M2

## 2025-06-16 DIAGNOSIS — M54.81 OCCIPITAL NEURALGIA OF RIGHT SIDE: ICD-10-CM

## 2025-06-16 DIAGNOSIS — M54.81 BILATERAL OCCIPITAL NEURALGIA: Primary | ICD-10-CM

## 2025-06-16 DIAGNOSIS — G43.709 CHRONIC MIGRAINE WITHOUT AURA WITHOUT STATUS MIGRAINOSUS, NOT INTRACTABLE: ICD-10-CM

## 2025-06-16 DIAGNOSIS — M47.812 CERVICAL SPONDYLOSIS: ICD-10-CM

## 2025-06-16 PROCEDURE — 98006 SYNCH AUDIO-VIDEO EST MOD 30: CPT | Mod: 95,,, | Performed by: ANESTHESIOLOGY

## 2025-06-17 ENCOUNTER — PATIENT MESSAGE (OUTPATIENT)
Dept: INTERNAL MEDICINE | Facility: CLINIC | Age: 45
End: 2025-06-17
Payer: COMMERCIAL

## 2025-06-17 RX ORDER — DEXTROAMPHETAMINE SACCHARATE, AMPHETAMINE ASPARTATE MONOHYDRATE, DEXTROAMPHETAMINE SULFATE AND AMPHETAMINE SULFATE 5; 5; 5; 5 MG/1; MG/1; MG/1; MG/1
20 CAPSULE, EXTENDED RELEASE ORAL EVERY MORNING
Qty: 30 CAPSULE | Refills: 0 | OUTPATIENT
Start: 2025-06-17

## 2025-06-23 ENCOUNTER — TELEPHONE (OUTPATIENT)
Dept: INTERNAL MEDICINE | Facility: CLINIC | Age: 45
End: 2025-06-23
Payer: COMMERCIAL

## 2025-06-24 NOTE — PRE-PROCEDURE INSTRUCTIONS
Spoke with patient regarding procedure scheduled on 7/2     Arrival time 1030     Has patient been sick with fever or on antibiotics within the last 7 days? No     Does the patient have any open wounds, sores or rashes? No     Does the patient have any recent fractures? no     Has patient received a vaccination within the last 7 days? No     Received the COVID vaccination?      Has the patient stopped all medications as directed? na     Does patient have a pacemaker, defibrillator, or implantable stimulator? NONE     Does the patient have a ride to and from procedure and someone reliable to remain with patient? Vinayak     Is the patient diabetic? no     Does the patient have sleep apnea? Or use O2 at home? no     Is the patient receiving sedation?      Is the patient instructed to remain NPO beginning at midnight the night before their procedure? yes     Procedure location confirmed with patient? Yes     Covid- Denies signs/symptoms. Instructed to notify PAT/MD if any changes.

## 2025-06-27 ENCOUNTER — E-VISIT (OUTPATIENT)
Dept: INTERNAL MEDICINE | Facility: CLINIC | Age: 45
End: 2025-06-27
Payer: COMMERCIAL

## 2025-06-27 DIAGNOSIS — E66.09 CLASS 1 OBESITY DUE TO EXCESS CALORIES WITH SERIOUS COMORBIDITY AND BODY MASS INDEX (BMI) OF 31.0 TO 31.9 IN ADULT: Primary | ICD-10-CM

## 2025-06-27 DIAGNOSIS — E66.811 CLASS 1 OBESITY DUE TO EXCESS CALORIES WITH SERIOUS COMORBIDITY AND BODY MASS INDEX (BMI) OF 31.0 TO 31.9 IN ADULT: Primary | ICD-10-CM

## 2025-06-27 RX ORDER — TIRZEPATIDE 5 MG/.5ML
5 INJECTION, SOLUTION SUBCUTANEOUS
Qty: 2 ML | Refills: 2 | Status: SHIPPED | OUTPATIENT
Start: 2025-07-20

## 2025-06-27 RX ORDER — TIRZEPATIDE 2.5 MG/.5ML
2.5 INJECTION, SOLUTION SUBCUTANEOUS
Qty: 2 ML | Refills: 0 | Status: SHIPPED | OUTPATIENT
Start: 2025-06-27 | End: 2025-07-19

## 2025-06-27 NOTE — PROGRESS NOTES
Patient ID: Alicia Teixeira is a 44 y.o. female.    Chief Complaint: General Illness (Entered automatically based on patient selection in [employer].)    The patient initiated a request through [employer] on 6/27/2025 for evaluation and management with a chief complaint of General Illness (Entered automatically based on patient selection in [employer].)     I evaluated the questionnaire submission on 06/27/2025.  Documentation entered by me for this encounter may have been done in part using speech-recognition technology. I have reviewed the content for accuracy, though errors in syntax, spelling, or similar-sounding words may be present and should be interpreted in context. Please contact the author for any clarification.     [facility]    6/27/2025  8:30 AM CDT - Filed by Patient   What do you need help with? Weight Management   Do you agree to participate in an E-Visit? Yes   If you have any of the following symptoms, please present to your local emergency room or call 911: I acknowledge   Medication requests for narcotics will not be addressed via an E-Visit.  Please schedule an appointment. I acknowledge   Do you have any of the following pregnancy-related conditions? (Pregnant, Possibly pregnant, Breast feeding, None) None   What is your current weight? 213   What best describes your appetite? Big   Do you have specific food cravings? Yes   Describe your specific food cravings: Sweets   When you eat, how quickly do you feel full A long time   Give an example of what you have eaten in a day in the past 2 weeks:    Breakfast: Green smoothie with protein powder   Lunch: Turkey sandwich on wheat with lettuce tomatoes and mustard   Dinner: Chicken legs broccoli and rice   Snacks: Protein bar or oranges and cheese   Drinks: Mostly water and lemonade with dinner   Have you exercised in the past week? Yes   What type of exercise? I'm following a move program offered by my insurance   How many days in  a week do you exercise? 3   How many minutes do you exericse? 30   Do you have a personal or family history of thyroid cancer? No   Do you have a personal history of kidney stones? No   Do you have a personal history of seizures? No   Do you have a personal history of pancreatitis? No   I would like to address: Medication for weight loss   Do you want to address a new or existing medication? (Start a new medication, Address a current medication) Start a new medication   What is the name of the medication you would like to start? Zepbound vials   Have you taken a similar medication in the past? Yes   What is the name of the similar medication you used in the past? Semaglutide   Why do you no longer use the similar medication? (Cost or insurance issues, Medication no longer available, Side effects, Medication not effective, None) Side effects   List the side effects you had with the medication: Hair loss   What side effects did you have? (Nausea, Drowsiness or fatigue, Headaches, Diarrhea, Dizziness or lightheadedness, Rash or itching, Unexpected weight changes, Mood changes (e.g., depression, anxiety, irritability), Muscle aches, None, Other) None   Did you stop taking the medication? Yes   Are you still experiencing the side effects? No    What medical condition is the  medication intended to treat? Weight loss   Provide any information you feel is important to your history not asked above I am following from a message in the portal. Dr. Hutchison prescribed zebound but it wasn't covered. I would like to get the script from [facility] direct for the vials and self pay.   Please attach any relevant images or files    Are you able to take your vitals? No       Current Outpatient Medications   Medication    almotriptan (AXERT) 12.5 MG tablet    amitriptyline (ELAVIL) 10 MG tablet    azithromycin (ZITHROMAX) 500 MG tablet    buPROPion (WELLBUTRIN XL) 300 MG 24 hr tablet    dextroamphetamine-amphetamine (ADDERALL XR) 20 MG  "24 hr capsule    dextroamphetamine-amphetamine (ADDERALL XR) 20 MG 24 hr capsule    [START ON 7/10/2025] dextroamphetamine-amphetamine (ADDERALL XR) 20 MG 24 hr capsule    galcanezumab-gnlm 120 mg/mL PnIj    tirzepatide, weight loss, (ZEPBOUND) 2.5 mg/0.5 mL Soln    [START ON 7/20/2025] tirzepatide, weight loss, (ZEPBOUND) 5 mg/0.5 mL Soln     Current Facility-Administered Medications   Medication Frequency    methylPREDNISolone acetate injection 40 mg 1 time in Clinic/HOD    methylPREDNISolone acetate injection 40 mg 1 time in Clinic/HOD    [START ON 7/9/2025] onabotulinumtoxina injection 200 Units Q90 Days      Estimated body mass index is 31.01 kg/m² as calculated from the following:    Height as of 6/16/25: 5' 9" (1.753 m).    Weight as of 6/8/25: 95.3 kg (210 lb).    1. Class 1 obesity due to excess calories with serious comorbidity and body mass index (BMI) of 31.0 to 31.9 in adult  Overview:  No history of or family history of thyroid cancer or multiple endocrine neoplasia syndrome.     Orders:  -     tirzepatide, weight loss, (ZEPBOUND) 2.5 mg/0.5 mL Soln; Inject 0.5 mLs (2.5 mg total) into the skin every 7 days. for 4 doses  Dispense: 2 mL; Refill: 0  -     tirzepatide, weight loss, (ZEPBOUND) 5 mg/0.5 mL Soln; Inject 0.5 mLs (5 mg total) into the skin every 7 days. They are to begin this prescription AFTER completion of initial four doses of 2.5 mg/week. Instruct patient on proper dosing and injection technique. Appointment required for more refills.  Dispense: 2 mL; Refill: 2       Medications Discontinued During This Encounter   Medication Reason    tirzepatide, weight loss, (ZEPBOUND) 5 mg/0.5 mL PnIj Change in Dosage Form    tirzepatide, weight loss, (ZEPBOUND) 2.5 mg/0.5 mL PnIj Change in Dosage Form     Medications Ordered This Encounter   Medications    tirzepatide, weight loss, (ZEPBOUND) 2.5 mg/0.5 mL Soln     Sig: Inject 0.5 mLs (2.5 mg total) into the skin every 7 days. for 4 doses     Dispense: "  2 mL     Refill:  0     Instruct patient on proper dosing and injection technique. A separate prescription will be provided for continuation of this medicine.    tirzepatide, weight loss, (ZEPBOUND) 5 mg/0.5 mL Soln     Sig: Inject 0.5 mLs (5 mg total) into the skin every 7 days. They are to begin this prescription AFTER completion of initial four doses of 2.5 mg/week. Instruct patient on proper dosing and injection technique. Appointment required for more refills.     Dispense:  2 mL     Refill:  2       No follow-ups on file.  Future Appointments   Date Time Provider Department Center   7/8/2025  2:30 PM Duc Borden MD [facility]   7/30/2025  8:20 AM Liliane Arriaga NP [facility] IN [facility]    7/30/2025 10:20 AM [facility]        E-Visit Time Tracking:  Day 1 Time (in minutes): 9    Total Time (in minutes): 9  This time was exclusive of any separately billable procedures for this patient and exclusive of time spent treating any other patient.  Alicia Ruiz.    I sent your new prescriptions for Zepbound vials to [facility] (Phone: [phone]).    Let me know if I can do anything else for you.    Since you're getting a late start on the Zepbound, feel free to cancel your 7/30/25 appointment with me a schedule your next appointment (virtual visit OK) in early-mid October.    I look forward to seeing you at your next appointment. Until then, take care and be well!    Dr. VERNA Lr    Medications Ordered This Encounter   Medications    tirzepatide, weight loss, (ZEPBOUND) 2.5 mg/0.5 mL Soln     Sig: Inject 0.5 mLs (2.5 mg total) into the skin every 7 days. for 4 doses     Dispense:  2 mL     Refill:  0     Instruct patient on proper dosing and injection technique. A separate prescription will be provided for continuation of this medicine.    tirzepatide, weight loss, (ZEPBOUND) 5 mg/0.5 mL Soln     Sig: Inject 0.5 mLs (5 mg total) into the skin  every 7 days. They are to begin this prescription AFTER completion of initial four doses of 2.5 mg/week. Instruct patient on proper dosing and injection technique. Appointment required for more refills.     Dispense:  2 mL     Refill:  2

## 2025-07-02 ENCOUNTER — HOSPITAL ENCOUNTER (OUTPATIENT)
Facility: HOSPITAL | Age: 45
Discharge: HOME | End: 2025-07-02
Attending: ANESTHESIOLOGY | Admitting: ANESTHESIOLOGY
Payer: COMMERCIAL

## 2025-07-02 VITALS
HEIGHT: 69 IN | DIASTOLIC BLOOD PRESSURE: 63 MMHG | HEART RATE: 93 BPM | RESPIRATION RATE: 16 BRPM | TEMPERATURE: 98 F | BODY MASS INDEX: 31.33 KG/M2 | WEIGHT: 211.56 LBS | SYSTOLIC BLOOD PRESSURE: 118 MMHG | OXYGEN SATURATION: 100 %

## 2025-07-02 DIAGNOSIS — M54.81 OCCIPITAL NEURALGIA OF RIGHT SIDE: ICD-10-CM

## 2025-07-02 LAB
B-HCG UR QL: NEGATIVE
CTP QC/QA: YES

## 2025-07-02 PROCEDURE — 81025 URINE PREGNANCY TEST: CPT | Performed by: ANESTHESIOLOGY

## 2025-07-02 PROCEDURE — 63600175 PHARM REV CODE 636 W HCPCS: Performed by: ANESTHESIOLOGY

## 2025-07-02 PROCEDURE — 64640 INJECTION TREATMENT OF NERVE: CPT | Mod: RT | Performed by: ANESTHESIOLOGY

## 2025-07-02 PROCEDURE — 25000003 PHARM REV CODE 250: Performed by: ANESTHESIOLOGY

## 2025-07-02 PROCEDURE — 99152 MOD SED SAME PHYS/QHP 5/>YRS: CPT | Performed by: ANESTHESIOLOGY

## 2025-07-02 PROCEDURE — 64640 INJECTION TREATMENT OF NERVE: CPT | Mod: RT,,, | Performed by: ANESTHESIOLOGY

## 2025-07-02 RX ORDER — DEXAMETHASONE SODIUM PHOSPHATE 10 MG/ML
INJECTION, SOLUTION INTRA-ARTICULAR; INTRALESIONAL; INTRAMUSCULAR; INTRAVENOUS; SOFT TISSUE
Status: DISCONTINUED | OUTPATIENT
Start: 2025-07-02 | End: 2025-07-02 | Stop reason: HOSPADM

## 2025-07-02 RX ORDER — LIDOCAINE HYDROCHLORIDE 20 MG/ML
INJECTION, SOLUTION EPIDURAL; INFILTRATION; INTRACAUDAL; PERINEURAL
Status: DISCONTINUED | OUTPATIENT
Start: 2025-07-02 | End: 2025-07-02 | Stop reason: HOSPADM

## 2025-07-02 RX ORDER — FENTANYL CITRATE 50 UG/ML
INJECTION, SOLUTION INTRAMUSCULAR; INTRAVENOUS
Status: DISCONTINUED | OUTPATIENT
Start: 2025-07-02 | End: 2025-07-02 | Stop reason: HOSPADM

## 2025-07-02 RX ORDER — MIDAZOLAM HYDROCHLORIDE 1 MG/ML
INJECTION, SOLUTION INTRAMUSCULAR; INTRAVENOUS
Status: DISCONTINUED | OUTPATIENT
Start: 2025-07-02 | End: 2025-07-02 | Stop reason: HOSPADM

## 2025-07-02 RX ORDER — INDOMETHACIN 25 MG/1
CAPSULE ORAL
Status: DISCONTINUED | OUTPATIENT
Start: 2025-07-02 | End: 2025-07-02 | Stop reason: HOSPADM

## 2025-07-02 RX ORDER — KETOROLAC TROMETHAMINE 10 MG/1
10 TABLET, FILM COATED ORAL 2 TIMES DAILY PRN
Qty: 14 TABLET | Refills: 0 | Status: SHIPPED | OUTPATIENT
Start: 2025-07-02 | End: 2025-07-09

## 2025-07-02 RX ORDER — BUPIVACAINE HYDROCHLORIDE 2.5 MG/ML
INJECTION, SOLUTION EPIDURAL; INFILTRATION; INTRACAUDAL; PERINEURAL
Status: DISCONTINUED | OUTPATIENT
Start: 2025-07-02 | End: 2025-07-02 | Stop reason: HOSPADM

## 2025-07-02 NOTE — PLAN OF CARE
Pt and spouse verbalized understanding of discharge instructions. Patient voiced no complaints, with no further questions at this time. VSS on RA. Recovery care complete.

## 2025-07-02 NOTE — OP NOTE
Right Greater and Lesser Occipital Nerve RFA    INFORMED CONSENT: The procedure, risks, benefits and options were discussed with patient. There are no contraindications to the procedure. The patient expressed understanding and agreed to proceed. The personnel performing the procedure was discussed.    Date of procedure 07/02/2025    Time-out taken to identify patient and procedure side prior to starting the procedure.                     Procedure:  Right Greater and Lesser Occipital RFA with guidance from Ultrasound     Pre Procedure diagnosis:    Occipital neuralgia of right side [M54.81]  1. Occipital neuralgia of right side        Post-Procedure diagnosis:   same      Surgeon: Duc Borden MD    Assistants: None    Local: 3ml Lidocaine PF 1%      Sedation Type: Conscious sedation per MD    SEDATION MEDICATIONS: local/IV sedation: Versed 2 mg and fentanyl 50 mcg IV.  Conscious sedation ordered by MD.  Patient reevaluated and sedation administered by RN and patient monitored by RN and MD.  Total sedation time was less than 20 min.        Complications: None    Specimens: None        TECHNIQUE:     The patient was brought to the procedure room. IV access was obtained prior to the procedure. The patient was positioned prone on the fluoroscopy table. Continuous hemodynamic monitoring was initiated including blood pressure, EKG, and pulse oximetry. . The skin was prepped with chlorhexidine and draped in a sterile fashion.    Using guidance from an ultrasound machine as well as palpation, the right greater and lesser occipital artery were identified as well as the Semispinalis capitis and Obliquus capitis muscles.  The areas just lateral and medial to these structures were then anesthetized with local anesthetic using a 25 gauge hypodermic needle.  After negative aspiration, a 22G  1.5 inch RFA needle was guided to the location of the greater and lesser occipital nerve  Sensory stimulation reproduced the patients pain,  and motor stimulation was negative. After negative aspiration, 1ml of 1% lidocaine was injected into each needle and pulsed RFA was performed for 120 seconds at 45 Degrees Celsius. After RFA was performed 0.5ml of 0.25% bupivacaine and 0.5ml of decadron 10mg/ml PF was injected into each needle.  Dressing was then placed over the injection sites, and sites noted to be hemostatic.    The patient was monitored for approximately 30 minutes after the procedure.  Patient was given post procedure and discharge instructions to follow at home.  The patient was discharged in a stable condition

## 2025-07-02 NOTE — DISCHARGE SUMMARY
\Discharge Note  Short Stay      SUMMARY     Admit Date: 7/2/2025    Attending Physician: Duc Borden MD        Discharge Physician: Duc Borden MD        Discharge Date: 7/2/2025 12:49 PM    Procedure(s) (LRB):  Right Greater and Lesser Occipital nerve Radiofrequency Ablation; schedule for 45 minutes with  11 AM on a WEDNESDAY. DO NOT MOVE (Right)    Final Diagnosis: Occipital neuralgia of right side [M54.81]    Disposition: Home or self care    Patient Instructions:   Current Discharge Medication List        START taking these medications    Details   ketorolac (TORADOL) 10 mg tablet Take 1 tablet (10 mg total) by mouth 2 (two) times daily as needed (severe pain). Take with food.  Avoid other OTC NSAIDs (ex. Ibuprofen, naproxen) while taking this medication.  Qty: 14 tablet, Refills: 0           CONTINUE these medications which have NOT CHANGED    Details   buPROPion (WELLBUTRIN XL) 300 MG 24 hr tablet Take 1 tablet (300 mg total) by mouth every morning.  Qty: 30 tablet, Refills: 2    Associated Diagnoses: Recurrent mild major depressive disorder with anxiety      tirzepatide, weight loss, (ZEPBOUND) 2.5 mg/0.5 mL Soln Inject 0.5 mLs (2.5 mg total) into the skin every 7 days. for 4 doses  Qty: 2 mL, Refills: 0    Comments: Instruct patient on proper dosing and injection technique. A separate prescription will be provided for continuation of this medicine.  Associated Diagnoses: Class 1 obesity due to excess calories with serious comorbidity and body mass index (BMI) of 31.0 to 31.9 in adult      almotriptan (AXERT) 12.5 MG tablet Take 1 tablet (12.5 mg total) by mouth 2 (two) times daily as needed for Migraine. may repeat in 2 hours if needed  Qty: 12 tablet, Refills: 5    Associated Diagnoses: Chronic migraine without aura without status migrainosus, not intractable      amitriptyline (ELAVIL) 10 MG tablet Take 1 tablet (10 mg total) by mouth every evening.  Qty: 30 tablet, Refills: 0    Associated  "Diagnoses: Intractable chronic migraine with aura and without status migrainosus; Bilateral occipital neuralgia; Recurrent mild major depressive disorder with anxiety      azithromycin (ZITHROMAX) 500 MG tablet Take 1 tablet (500 mg total) by mouth once daily.  Qty: 3 tablet, Refills: 0      !! dextroamphetamine-amphetamine (ADDERALL XR) 20 MG 24 hr capsule Take 1 capsule (20 mg total) by mouth every morning.  Qty: 30 capsule, Refills: 0    Comments: 1  Associated Diagnoses: ADHD, predominantly inattentive type      !! dextroamphetamine-amphetamine (ADDERALL XR) 20 MG 24 hr capsule Take 1 capsule (20 mg total) by mouth every morning.  Qty: 30 capsule, Refills: 0    Comments: 2. May fill on or after specified "Start Date."  Associated Diagnoses: ADHD, predominantly inattentive type      !! dextroamphetamine-amphetamine (ADDERALL XR) 20 MG 24 hr capsule Take 1 capsule (20 mg total) by mouth every morning.  Qty: 30 capsule, Refills: 0    Comments: 3. May fill on or after specified "Start Date."  Associated Diagnoses: ADHD, predominantly inattentive type      galcanezumab-gnlm 120 mg/mL PnIj Inject 1 mL (120 mg total) into the skin every 30 days. maintenance dose  Qty: 1 mL, Refills: 2    Associated Diagnoses: Intractable chronic migraine with aura and without status migrainosus      tirzepatide, weight loss, (ZEPBOUND) 5 mg/0.5 mL Soln Inject 0.5 mLs (5 mg total) into the skin every 7 days. They are to begin this prescription AFTER completion of initial four doses of 2.5 mg/week. Instruct patient on proper dosing and injection technique. Appointment required for more refills.  Qty: 2 mL, Refills: 2    Associated Diagnoses: Class 1 obesity due to excess calories with serious comorbidity and body mass index (BMI) of 31.0 to 31.9 in adult       !! - Potential duplicate medications found. Please discuss with provider.              Discharge Diagnosis: Occipital neuralgia of right side [M54.81]  Condition on Discharge: " Stable with no complications to procedure   Diet on Discharge: Same as before.  Activity: as per instruction sheet.  Discharge to: Home with a responsible adult.  Follow up: 2-4 weeks       Please call the office at [phone] if you experience any weakness or loss of sensation, fever > 101.5, pain uncontrolled with oral medications, persistent nausea/vomiting/or diarrhea, redness or drainage from the incisions, or any other worrisome concerns. If physician on call was not reached or could not communicate with our office for any reason please go to the nearest emergency department

## 2025-07-02 NOTE — DISCHARGE INSTRUCTIONS

## 2025-07-08 ENCOUNTER — OFFICE VISIT (OUTPATIENT)
Dept: PAIN MEDICINE | Facility: CLINIC | Age: 45
End: 2025-07-08
Payer: COMMERCIAL

## 2025-07-08 VITALS
HEART RATE: 91 BPM | RESPIRATION RATE: 17 BRPM | BODY MASS INDEX: 31.32 KG/M2 | SYSTOLIC BLOOD PRESSURE: 122 MMHG | WEIGHT: 211.44 LBS | HEIGHT: 69 IN | DIASTOLIC BLOOD PRESSURE: 85 MMHG

## 2025-07-08 DIAGNOSIS — M47.812 CERVICAL SPONDYLOSIS: ICD-10-CM

## 2025-07-08 DIAGNOSIS — M54.81 BILATERAL OCCIPITAL NEURALGIA: ICD-10-CM

## 2025-07-08 DIAGNOSIS — G43.709 CHRONIC MIGRAINE WITHOUT AURA WITHOUT STATUS MIGRAINOSUS, NOT INTRACTABLE: Primary | ICD-10-CM

## 2025-07-08 PROCEDURE — 99999 PR PBB SHADOW E&M-EST. PATIENT-LVL IV: CPT | Mod: PBBFAC,,, | Performed by: ANESTHESIOLOGY
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
## 2025-07-15 DIAGNOSIS — E66.09 CLASS 1 OBESITY DUE TO EXCESS CALORIES WITH SERIOUS COMORBIDITY AND BODY MASS INDEX (BMI) OF 31.0 TO 31.9 IN ADULT: ICD-10-CM

## 2025-07-15 DIAGNOSIS — E66.811 CLASS 1 OBESITY DUE TO EXCESS CALORIES WITH SERIOUS COMORBIDITY AND BODY MASS INDEX (BMI) OF 31.0 TO 31.9 IN ADULT: ICD-10-CM

## 2025-07-15 RX ORDER — TIRZEPATIDE 2.5 MG/.5ML
INJECTION, SOLUTION SUBCUTANEOUS
Qty: 2 ML | Refills: 0 | OUTPATIENT
Start: 2025-07-15

## 2025-07-16 NOTE — TELEPHONE ENCOUNTER
Provider Staff:  Action required for this patient       Please see care gap opportunities below in Care Due Message.    Thanks!  [facility]     Appointments      Date Provider   Last Visit   6/27/2025 SHEILA Hutchison MD   Next Visit   Visit date not found SHEILA Hutchison MD     Refill Decision Note   Alicia Teixeira  is requesting a refill authorization.  Brief Assessment and Rationale for Refill:  Quick Discontinue     Medication Therapy Plan:  Per provider instructions, patient has new rx on file to be statrted once this dose has been completed      Comments:     Note composed:9:56 PM 07/15/2025
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
## 2025-07-28 ENCOUNTER — OFFICE VISIT (OUTPATIENT)
Dept: PAIN MEDICINE | Facility: CLINIC | Age: 45
End: 2025-07-28
Payer: COMMERCIAL

## 2025-07-28 DIAGNOSIS — M47.812 CERVICAL SPONDYLOSIS: ICD-10-CM

## 2025-07-28 DIAGNOSIS — G43.709 CHRONIC MIGRAINE WITHOUT AURA WITHOUT STATUS MIGRAINOSUS, NOT INTRACTABLE: Primary | ICD-10-CM

## 2025-07-28 DIAGNOSIS — M54.81 OCCIPITAL NEURALGIA OF RIGHT SIDE: ICD-10-CM

## 2025-07-28 PROCEDURE — 98006 SYNCH AUDIO-VIDEO EST MOD 30: CPT | Mod: 95,,, | Performed by: ANESTHESIOLOGY

## 2025-07-28 RX ORDER — CLOBETASOL PROPIONATE 0.5 MG/G
OINTMENT TOPICAL 2 TIMES DAILY
Qty: 15 G | Refills: 3 | Status: SHIPPED | OUTPATIENT
Start: 2025-07-28

## 2025-07-28 RX ORDER — METHYLPREDNISOLONE 4 MG/1
TABLET ORAL
Qty: 21 TABLET | Refills: 0 | Status: SHIPPED | OUTPATIENT
Start: 2025-07-28

## 2025-08-01 ENCOUNTER — HOSPITAL ENCOUNTER (OUTPATIENT)
Dept: RADIOLOGY | Facility: HOSPITAL | Age: 45
Discharge: HOME | End: 2025-08-01
Attending: FAMILY MEDICINE
Payer: COMMERCIAL

## 2025-08-01 DIAGNOSIS — Z12.31 BREAST CANCER SCREENING BY MAMMOGRAM: ICD-10-CM

## 2025-08-01 PROCEDURE — 77063 BREAST TOMOSYNTHESIS BI: CPT | Mod: 26,,, | Performed by: RADIOLOGY

## 2025-08-01 PROCEDURE — 77067 SCR MAMMO BI INCL CAD: CPT | Mod: 26,,, | Performed by: RADIOLOGY

## 2025-08-01 PROCEDURE — 77063 BREAST TOMOSYNTHESIS BI: CPT | Mod: TC,PO

## 2025-08-12 DIAGNOSIS — F33.0 RECURRENT MILD MAJOR DEPRESSIVE DISORDER WITH ANXIETY: ICD-10-CM

## 2025-08-12 DIAGNOSIS — F41.9 RECURRENT MILD MAJOR DEPRESSIVE DISORDER WITH ANXIETY: ICD-10-CM

## 2025-08-12 RX ORDER — BUPROPION HYDROCHLORIDE 300 MG/1
300 TABLET ORAL EVERY MORNING
Qty: 30 TABLET | Refills: 2 | Status: SHIPPED | OUTPATIENT
Start: 2025-08-12 | End: 2025-11-10

## 2025-08-13 ENCOUNTER — LAB VISIT (OUTPATIENT)
Dept: LAB | Facility: HOSPITAL | Age: 45
End: 2025-08-13
Attending: OBSTETRICS & GYNECOLOGY
Payer: COMMERCIAL

## 2025-08-13 DIAGNOSIS — L70.9 ACNE, UNSPECIFIED ACNE TYPE: ICD-10-CM

## 2025-08-13 DIAGNOSIS — R23.2 HOT FLASHES: ICD-10-CM

## 2025-08-13 LAB
FSH SERPL-ACNC: 9.12 MIU/ML
TESTOST SERPL-MCNC: 25 NG/DL (ref 5–73)

## 2025-08-13 PROCEDURE — 36415 COLL VENOUS BLD VENIPUNCTURE: CPT | Mod: PO

## 2025-08-13 PROCEDURE — 84403 ASSAY OF TOTAL TESTOSTERONE: CPT

## 2025-08-13 PROCEDURE — 83001 ASSAY OF GONADOTROPIN (FSH): CPT

## 2025-08-19 PROBLEM — Z91.89 AT HIGH RISK FOR BREAST CANCER: Status: ACTIVE | Noted: 2025-08-19

## 2025-08-22 DIAGNOSIS — F90.0 ADHD, PREDOMINANTLY INATTENTIVE TYPE: Chronic | ICD-10-CM

## 2025-08-25 PROBLEM — Z91.89 AT HIGH RISK FOR BREAST CANCER: Status: RESOLVED | Noted: 2025-08-19 | Resolved: 2025-08-25

## 2025-08-27 ENCOUNTER — OFFICE VISIT (OUTPATIENT)
Dept: INTERNAL MEDICINE | Facility: CLINIC | Age: 45
End: 2025-08-27
Payer: COMMERCIAL

## 2025-08-27 VITALS
TEMPERATURE: 98 F | HEIGHT: 69 IN | BODY MASS INDEX: 30.23 KG/M2 | OXYGEN SATURATION: 99 % | DIASTOLIC BLOOD PRESSURE: 80 MMHG | SYSTOLIC BLOOD PRESSURE: 110 MMHG | HEART RATE: 89 BPM | WEIGHT: 204.13 LBS

## 2025-08-27 DIAGNOSIS — R42 ORTHOSTATIC LIGHTHEADEDNESS: ICD-10-CM

## 2025-08-27 DIAGNOSIS — U09.9 POST-COVID SYNDROME: Primary | ICD-10-CM

## 2025-08-27 PROCEDURE — 99999 PR PBB SHADOW E&M-EST. PATIENT-LVL IV: CPT | Mod: PBBFAC,,, | Performed by: INTERNAL MEDICINE

## 2025-08-27 RX ORDER — PREDNISONE 10 MG/1
10 TABLET ORAL DAILY
Qty: 3 TABLET | Refills: 0 | Status: SHIPPED | OUTPATIENT
Start: 2025-08-27 | End: 2025-09-01

## 2025-08-28 ENCOUNTER — RESULTS FOLLOW-UP (OUTPATIENT)
Dept: INTERNAL MEDICINE | Facility: CLINIC | Age: 45
End: 2025-08-28
Payer: COMMERCIAL

## 2025-08-28 ENCOUNTER — LAB VISIT (OUTPATIENT)
Dept: LAB | Facility: HOSPITAL | Age: 45
End: 2025-08-28
Attending: INTERNAL MEDICINE
Payer: COMMERCIAL

## 2025-08-28 DIAGNOSIS — U09.9 POST-COVID SYNDROME: ICD-10-CM

## 2025-08-28 LAB
ABSOLUTE EOSINOPHIL (OHS): 0.15 K/UL
ABSOLUTE MONOCYTE (OHS): 0.7 K/UL (ref 0.3–1)
ABSOLUTE NEUTROPHIL COUNT (OHS): 4.32 K/UL (ref 1.8–7.7)
ALBUMIN SERPL BCP-MCNC: 3.5 G/DL (ref 3.5–5.2)
ANION GAP (OHS): 9 MMOL/L (ref 8–16)
BASOPHILS # BLD AUTO: 0.06 K/UL
BASOPHILS NFR BLD AUTO: 0.9 %
BUN SERPL-MCNC: 8 MG/DL (ref 6–20)
CALCIUM SERPL-MCNC: 8.9 MG/DL (ref 8.7–10.5)
CHLORIDE SERPL-SCNC: 109 MMOL/L (ref 95–110)
CO2 SERPL-SCNC: 21 MMOL/L (ref 23–29)
CREAT SERPL-MCNC: 0.9 MG/DL (ref 0.5–1.4)
ERYTHROCYTE [DISTWIDTH] IN BLOOD BY AUTOMATED COUNT: 14.7 % (ref 11.5–14.5)
GFR SERPLBLD CREATININE-BSD FMLA CKD-EPI: >60 ML/MIN/1.73/M2
GLUCOSE SERPL-MCNC: 78 MG/DL (ref 70–110)
HCT VFR BLD AUTO: 37.7 % (ref 37–48.5)
HGB BLD-MCNC: 11.6 GM/DL (ref 12–16)
IMM GRANULOCYTES # BLD AUTO: 0.01 K/UL (ref 0–0.04)
IMM GRANULOCYTES NFR BLD AUTO: 0.1 % (ref 0–0.5)
LYMPHOCYTES # BLD AUTO: 1.8 K/UL (ref 1–4.8)
MCH RBC QN AUTO: 25.6 PG (ref 27–31)
MCHC RBC AUTO-ENTMCNC: 30.8 G/DL (ref 32–36)
MCV RBC AUTO: 83 FL (ref 82–98)
NUCLEATED RBC (/100WBC) (OHS): 0 /100 WBC
PHOSPHATE SERPL-MCNC: 2.8 MG/DL (ref 2.7–4.5)
PLATELET # BLD AUTO: 376 K/UL (ref 150–450)
PMV BLD AUTO: 11.3 FL (ref 9.2–12.9)
POTASSIUM SERPL-SCNC: 4.1 MMOL/L (ref 3.5–5.1)
RBC # BLD AUTO: 4.53 M/UL (ref 4–5.4)
RELATIVE EOSINOPHIL (OHS): 2.1 %
RELATIVE LYMPHOCYTE (OHS): 25.6 % (ref 18–48)
RELATIVE MONOCYTE (OHS): 9.9 % (ref 4–15)
RELATIVE NEUTROPHIL (OHS): 61.4 % (ref 38–73)
SODIUM SERPL-SCNC: 139 MMOL/L (ref 136–145)
TSH SERPL-ACNC: 2.09 UIU/ML (ref 0.4–4)
WBC # BLD AUTO: 7.04 K/UL (ref 3.9–12.7)

## 2025-08-28 PROCEDURE — 85025 COMPLETE CBC W/AUTO DIFF WBC: CPT

## 2025-08-28 PROCEDURE — 36415 COLL VENOUS BLD VENIPUNCTURE: CPT | Mod: PO

## 2025-08-28 PROCEDURE — 82374 ASSAY BLOOD CARBON DIOXIDE: CPT

## 2025-08-28 PROCEDURE — 84443 ASSAY THYROID STIM HORMONE: CPT

## 2025-08-29 ENCOUNTER — PATIENT MESSAGE (OUTPATIENT)
Dept: INTERNAL MEDICINE | Facility: CLINIC | Age: 45
End: 2025-08-29
Payer: COMMERCIAL

## 2025-08-29 RX ORDER — DEXTROAMPHETAMINE SACCHARATE, AMPHETAMINE ASPARTATE MONOHYDRATE, DEXTROAMPHETAMINE SULFATE AND AMPHETAMINE SULFATE 5; 5; 5; 5 MG/1; MG/1; MG/1; MG/1
20 CAPSULE, EXTENDED RELEASE ORAL EVERY MORNING
Qty: 30 CAPSULE | Refills: 0 | OUTPATIENT
Start: 2025-08-29

## 2025-09-03 ENCOUNTER — TELEPHONE (OUTPATIENT)
Dept: PAIN MEDICINE | Facility: CLINIC | Age: 45
End: 2025-09-03
Payer: COMMERCIAL